# Patient Record
Sex: MALE | Race: WHITE | NOT HISPANIC OR LATINO | Employment: OTHER | URBAN - METROPOLITAN AREA
[De-identification: names, ages, dates, MRNs, and addresses within clinical notes are randomized per-mention and may not be internally consistent; named-entity substitution may affect disease eponyms.]

---

## 2020-07-16 ENCOUNTER — LAB VISIT (OUTPATIENT)
Dept: LAB | Facility: HOSPITAL | Age: 40
End: 2020-07-16
Attending: INTERNAL MEDICINE
Payer: COMMERCIAL

## 2020-07-16 ENCOUNTER — OFFICE VISIT (OUTPATIENT)
Dept: INTERNAL MEDICINE | Facility: CLINIC | Age: 40
End: 2020-07-16
Payer: COMMERCIAL

## 2020-07-16 VITALS
TEMPERATURE: 98 F | SYSTOLIC BLOOD PRESSURE: 126 MMHG | RESPIRATION RATE: 16 BRPM | BODY MASS INDEX: 28.22 KG/M2 | WEIGHT: 212.94 LBS | DIASTOLIC BLOOD PRESSURE: 62 MMHG | HEIGHT: 73 IN | HEART RATE: 72 BPM | OXYGEN SATURATION: 99 %

## 2020-07-16 DIAGNOSIS — Z00.00 ANNUAL PHYSICAL EXAM: ICD-10-CM

## 2020-07-16 DIAGNOSIS — Z00.00 ANNUAL PHYSICAL EXAM: Primary | ICD-10-CM

## 2020-07-16 DIAGNOSIS — Z72.0 TOBACCO ABUSE: ICD-10-CM

## 2020-07-16 LAB
ALBUMIN SERPL BCP-MCNC: 4.2 G/DL (ref 3.5–5.2)
ALP SERPL-CCNC: 74 U/L (ref 55–135)
ALT SERPL W/O P-5'-P-CCNC: 58 U/L (ref 10–44)
ANION GAP SERPL CALC-SCNC: 8 MMOL/L (ref 8–16)
AST SERPL-CCNC: 30 U/L (ref 10–40)
BASOPHILS # BLD AUTO: 0.05 K/UL (ref 0–0.2)
BASOPHILS NFR BLD: 0.9 % (ref 0–1.9)
BILIRUB SERPL-MCNC: 0.6 MG/DL (ref 0.1–1)
BUN SERPL-MCNC: 10 MG/DL (ref 6–20)
CALCIUM SERPL-MCNC: 9.5 MG/DL (ref 8.7–10.5)
CHLORIDE SERPL-SCNC: 106 MMOL/L (ref 95–110)
CHOLEST SERPL-MCNC: 176 MG/DL (ref 120–199)
CHOLEST/HDLC SERPL: 4.1 {RATIO} (ref 2–5)
CO2 SERPL-SCNC: 27 MMOL/L (ref 23–29)
CREAT SERPL-MCNC: 1.2 MG/DL (ref 0.5–1.4)
DIFFERENTIAL METHOD: ABNORMAL
EOSINOPHIL # BLD AUTO: 0 K/UL (ref 0–0.5)
EOSINOPHIL NFR BLD: 0.2 % (ref 0–8)
ERYTHROCYTE [DISTWIDTH] IN BLOOD BY AUTOMATED COUNT: 12.8 % (ref 11.5–14.5)
EST. GFR  (AFRICAN AMERICAN): >60 ML/MIN/1.73 M^2
EST. GFR  (NON AFRICAN AMERICAN): >60 ML/MIN/1.73 M^2
GLUCOSE SERPL-MCNC: 102 MG/DL (ref 70–110)
HCT VFR BLD AUTO: 41.4 % (ref 40–54)
HDLC SERPL-MCNC: 43 MG/DL (ref 40–75)
HDLC SERPL: 24.4 % (ref 20–50)
HGB BLD-MCNC: 13.9 G/DL (ref 14–18)
IMM GRANULOCYTES # BLD AUTO: 0.03 K/UL (ref 0–0.04)
IMM GRANULOCYTES NFR BLD AUTO: 0.6 % (ref 0–0.5)
LDLC SERPL CALC-MCNC: 114.2 MG/DL (ref 63–159)
LYMPHOCYTES # BLD AUTO: 2.3 K/UL (ref 1–4.8)
LYMPHOCYTES NFR BLD: 42.8 % (ref 18–48)
MCH RBC QN AUTO: 30.2 PG (ref 27–31)
MCHC RBC AUTO-ENTMCNC: 33.6 G/DL (ref 32–36)
MCV RBC AUTO: 90 FL (ref 82–98)
MONOCYTES # BLD AUTO: 0.5 K/UL (ref 0.3–1)
MONOCYTES NFR BLD: 8.9 % (ref 4–15)
NEUTROPHILS # BLD AUTO: 2.5 K/UL (ref 1.8–7.7)
NEUTROPHILS NFR BLD: 46.6 % (ref 38–73)
NONHDLC SERPL-MCNC: 133 MG/DL
NRBC BLD-RTO: 0 /100 WBC
PLATELET # BLD AUTO: 257 K/UL (ref 150–350)
PMV BLD AUTO: 10.7 FL (ref 9.2–12.9)
POTASSIUM SERPL-SCNC: 4.3 MMOL/L (ref 3.5–5.1)
PROT SERPL-MCNC: 7.3 G/DL (ref 6–8.4)
RBC # BLD AUTO: 4.61 M/UL (ref 4.6–6.2)
SODIUM SERPL-SCNC: 141 MMOL/L (ref 136–145)
TRIGL SERPL-MCNC: 94 MG/DL (ref 30–150)
TSH SERPL DL<=0.005 MIU/L-ACNC: 0.95 UIU/ML (ref 0.4–4)
WBC # BLD AUTO: 5.42 K/UL (ref 3.9–12.7)

## 2020-07-16 PROCEDURE — 99385 PREV VISIT NEW AGE 18-39: CPT | Mod: S$GLB,,, | Performed by: INTERNAL MEDICINE

## 2020-07-16 PROCEDURE — 84443 ASSAY THYROID STIM HORMONE: CPT

## 2020-07-16 PROCEDURE — 99999 PR PBB SHADOW E&M-EST. PATIENT-LVL III: ICD-10-PCS | Mod: PBBFAC,,, | Performed by: INTERNAL MEDICINE

## 2020-07-16 PROCEDURE — 36415 COLL VENOUS BLD VENIPUNCTURE: CPT | Mod: PO

## 2020-07-16 PROCEDURE — 99385 PR PREVENTIVE VISIT,NEW,18-39: ICD-10-PCS | Mod: S$GLB,,, | Performed by: INTERNAL MEDICINE

## 2020-07-16 PROCEDURE — 80061 LIPID PANEL: CPT

## 2020-07-16 PROCEDURE — 85025 COMPLETE CBC W/AUTO DIFF WBC: CPT

## 2020-07-16 PROCEDURE — 99999 PR PBB SHADOW E&M-EST. PATIENT-LVL III: CPT | Mod: PBBFAC,,, | Performed by: INTERNAL MEDICINE

## 2020-07-16 PROCEDURE — 80053 COMPREHEN METABOLIC PANEL: CPT

## 2020-07-16 RX ORDER — VARENICLINE TARTRATE 0.5 MG/1
0.5 TABLET, FILM COATED ORAL 2 TIMES DAILY
Qty: 9 TABLET | Refills: 0 | Status: SHIPPED | OUTPATIENT
Start: 2020-07-16 | End: 2021-11-10

## 2020-07-16 RX ORDER — VARENICLINE TARTRATE 1 MG/1
1 TABLET, FILM COATED ORAL 2 TIMES DAILY
Qty: 60 TABLET | Refills: 2 | Status: SHIPPED | OUTPATIENT
Start: 2020-07-16 | End: 2020-08-15

## 2020-07-16 NOTE — PROGRESS NOTES
Subjective:       Patient ID: Sukhwinder Lyon is a 40 y.o. male.    Chief Complaint: Establish Care and Annual Exam    HPI     40 y.o. male here for annual exam.     Cholesterol: needs  Vaccines: Influenza - not done; Tetanus - done last 10 yrs, not sure the last date. (clin psych)  Sexual Screening: active  STD screening: no concern  Eye exam: done last April  Prostate: no family history of cancer  Colonoscopy: Uncle with cancer.    Exercise:  Not recently.  Couple months ago, he was better about 30 minutes.  Diet: mostly home cooked.  Tries to limit fast food to once every 2 weeks.    History reviewed. No pertinent past medical history.  History reviewed. No pertinent surgical history.  Social History     Socioeconomic History    Marital status: Single     Spouse name: Not on file    Number of children: Not on file    Years of education: Not on file    Highest education level: Not on file   Occupational History    Not on file   Social Needs    Financial resource strain: Not hard at all    Food insecurity     Worry: Never true     Inability: Never true    Transportation needs     Medical: No     Non-medical: No   Tobacco Use    Smoking status: Current Some Day Smoker     Packs/day: 0.50     Years: 20.00     Pack years: 10.00     Types: Vaping with nicotine    Smokeless tobacco: Never Used    Tobacco comment: Formerly tobacco smoker, half pack per day, switched to vaping using half pod per day   Substance and Sexual Activity    Alcohol use: Yes     Alcohol/week: 6.0 standard drinks     Types: 3 Glasses of wine, 3 Cans of beer per week     Frequency: 2-3 times a week     Drinks per session: 1 or 2     Binge frequency: Less than monthly     Comment: Typically one or two drinks per occasion, 2-3 times per week    Drug use: Yes     Types: Marijuana     Comment: Occasional marijuana use, no problematic usage    Sexual activity: Yes     Partners: Male     Birth control/protection: Condom     Comment: One  sexual partner, committed relationship   Lifestyle    Physical activity     Days per week: 1 day     Minutes per session: 30 min    Stress: Not at all   Relationships    Social connections     Talks on phone: More than three times a week     Gets together: Twice a week     Attends Voodoo service: Not on file     Active member of club or organization: Yes     Attends meetings of clubs or organizations: 1 to 4 times per year     Relationship status: Living with partner   Other Topics Concern    Not on file   Social History Narrative    Not on file     Review of patient's allergies indicates:  No Known Allergies  Sukhwinder Lyon had no medications administered during this visit.    Review of Systems   Constitutional: Negative for activity change and unexpected weight change.   HENT: Negative for hearing loss, rhinorrhea and trouble swallowing.    Eyes: Negative for discharge and visual disturbance.   Respiratory: Negative for chest tightness and wheezing.    Cardiovascular: Negative for chest pain and palpitations.   Gastrointestinal: Negative for blood in stool, constipation, diarrhea and vomiting.   Endocrine: Negative for polydipsia and polyuria.   Genitourinary: Negative for difficulty urinating, hematuria and urgency.   Musculoskeletal: Negative for arthralgias, joint swelling and neck pain.   Neurological: Negative for weakness and headaches.   Psychiatric/Behavioral: Negative for confusion and dysphoric mood.         Objective:      Physical Exam  Vitals signs reviewed.   Constitutional:       Appearance: He is well-developed.   HENT:      Head: Normocephalic and atraumatic.      Mouth/Throat:      Pharynx: No oropharyngeal exudate.   Eyes:      General: No scleral icterus.        Right eye: No discharge.         Left eye: No discharge.      Pupils: Pupils are equal, round, and reactive to light.   Neck:      Musculoskeletal: Normal range of motion and neck supple.      Thyroid: No thyromegaly.       Trachea: No tracheal deviation.   Cardiovascular:      Rate and Rhythm: Normal rate and regular rhythm.      Heart sounds: Normal heart sounds. No murmur. No friction rub. No gallop.    Pulmonary:      Effort: Pulmonary effort is normal. No respiratory distress.      Breath sounds: Normal breath sounds. No wheezing or rales.   Chest:      Chest wall: No tenderness.   Abdominal:      General: Bowel sounds are normal. There is no distension.      Palpations: Abdomen is soft. There is no mass.      Tenderness: There is no abdominal tenderness. There is no guarding or rebound.   Musculoskeletal: Normal range of motion.         General: No tenderness.   Skin:     General: Skin is warm and dry.      Coloration: Skin is not pale.      Findings: No erythema or rash.   Neurological:      Mental Status: He is alert and oriented to person, place, and time.   Psychiatric:         Behavior: Behavior normal.         Assessment:       1. Annual physical exam    2. Tobacco abuse        Plan:       1.  Check CBC, CMP, TSH, lipids.  Discussed diet and exercise.  Up-to-date on vaccines.  2.  Tried Chantix to assist with quitting nicotine.  Discussed potential side effects of medication.

## 2020-07-17 ENCOUNTER — TELEPHONE (OUTPATIENT)
Dept: INTERNAL MEDICINE | Facility: CLINIC | Age: 40
End: 2020-07-17

## 2020-07-17 DIAGNOSIS — R79.89 ELEVATED LFTS: Primary | ICD-10-CM

## 2020-07-17 NOTE — TELEPHONE ENCOUNTER
Your thyroid function, electrolytes, kidney function, cholesterol are normal.  Your liver function is elevated.  I am ordering further lab work to evaluate.

## 2020-07-24 ENCOUNTER — LAB VISIT (OUTPATIENT)
Dept: LAB | Facility: HOSPITAL | Age: 40
End: 2020-07-24
Attending: INTERNAL MEDICINE
Payer: COMMERCIAL

## 2020-07-24 DIAGNOSIS — R79.89 ELEVATED LFTS: ICD-10-CM

## 2020-07-24 LAB
ALBUMIN SERPL BCP-MCNC: 4.6 G/DL (ref 3.5–5.2)
ALP SERPL-CCNC: 71 U/L (ref 55–135)
ALT SERPL W/O P-5'-P-CCNC: 56 U/L (ref 10–44)
ANION GAP SERPL CALC-SCNC: 6 MMOL/L (ref 8–16)
AST SERPL-CCNC: 28 U/L (ref 10–40)
BILIRUB SERPL-MCNC: 0.8 MG/DL (ref 0.1–1)
BUN SERPL-MCNC: 12 MG/DL (ref 6–20)
CALCIUM SERPL-MCNC: 9.4 MG/DL (ref 8.7–10.5)
CERULOPLASMIN SERPL-MCNC: 28 MG/DL (ref 15–45)
CHLORIDE SERPL-SCNC: 108 MMOL/L (ref 95–110)
CO2 SERPL-SCNC: 25 MMOL/L (ref 23–29)
CREAT SERPL-MCNC: 1 MG/DL (ref 0.5–1.4)
EST. GFR  (AFRICAN AMERICAN): >60 ML/MIN/1.73 M^2
EST. GFR  (NON AFRICAN AMERICAN): >60 ML/MIN/1.73 M^2
FERRITIN SERPL-MCNC: 209 NG/ML (ref 20–300)
GLUCOSE SERPL-MCNC: 101 MG/DL (ref 70–110)
POTASSIUM SERPL-SCNC: 3.9 MMOL/L (ref 3.5–5.1)
PROT SERPL-MCNC: 7.6 G/DL (ref 6–8.4)
SODIUM SERPL-SCNC: 139 MMOL/L (ref 136–145)

## 2020-07-24 PROCEDURE — 81596 NFCT DS CHRNC HCV 6 ASSAYS: CPT

## 2020-07-24 PROCEDURE — 80074 ACUTE HEPATITIS PANEL: CPT

## 2020-07-24 PROCEDURE — 82390 ASSAY OF CERULOPLASMIN: CPT

## 2020-07-24 PROCEDURE — 82728 ASSAY OF FERRITIN: CPT

## 2020-07-24 PROCEDURE — 80053 COMPREHEN METABOLIC PANEL: CPT

## 2020-07-27 LAB
A2 MACROGLOB SERPL-MCNC: 123 MG/DL (ref 100–280)
ALT SERPL W P-5'-P-CCNC: 59 U/L (ref 7–55)
APO A-I SERPL-MCNC: 127 MG/DL
BILIRUB SERPL-MCNC: 0.8 MG/DL
BIOPREDICTIVE SERIAL NUMBER: ABNORMAL
FIBROSIS STAGE SERPL QL: ABNORMAL
FIBROTEST INTERPRETATION: ABNORMAL
FIBROTEST-ACTITEST COMMENT: ABNORMAL
GGT SERPL-CCNC: 49 U/L (ref 8–61)
HAPTOGLOB SERPL-MCNC: 77 MG/DL (ref 30–200)
HAV IGM SERPL QL IA: NEGATIVE
HBV CORE IGM SERPL QL IA: NEGATIVE
HBV SURFACE AG SERPL QL IA: NEGATIVE
HCV AB SERPL QL IA: NEGATIVE
LIVER FIBR SCORE SERPL CALC.FIBROSURE: 0.2
NECROINFLAMMAT INTERP: ABNORMAL
NECROINFLAMMATORY ACT GRADE SERPL QL: ABNORMAL
NECROINFLAMMATORY ACT SCORE SERPL: 0.32

## 2020-07-31 ENCOUNTER — HOSPITAL ENCOUNTER (OUTPATIENT)
Dept: RADIOLOGY | Facility: HOSPITAL | Age: 40
Discharge: HOME | End: 2020-07-31
Attending: INTERNAL MEDICINE
Payer: COMMERCIAL

## 2020-07-31 DIAGNOSIS — R79.89 ELEVATED LFTS: ICD-10-CM

## 2020-07-31 PROCEDURE — 76705 ECHO EXAM OF ABDOMEN: CPT | Mod: 26,,, | Performed by: RADIOLOGY

## 2020-07-31 PROCEDURE — 76705 US ABDOMEN LIMITED: ICD-10-PCS | Mod: 26,,, | Performed by: RADIOLOGY

## 2020-07-31 PROCEDURE — 76705 ECHO EXAM OF ABDOMEN: CPT | Mod: TC

## 2020-08-04 ENCOUNTER — PATIENT MESSAGE (OUTPATIENT)
Dept: INTERNAL MEDICINE | Facility: CLINIC | Age: 40
End: 2020-08-04

## 2020-08-04 DIAGNOSIS — R53.83 FATIGUE, UNSPECIFIED TYPE: Primary | ICD-10-CM

## 2020-08-10 ENCOUNTER — LAB VISIT (OUTPATIENT)
Dept: LAB | Facility: HOSPITAL | Age: 40
End: 2020-08-10
Attending: INTERNAL MEDICINE
Payer: COMMERCIAL

## 2020-08-10 ENCOUNTER — TELEPHONE (OUTPATIENT)
Dept: INTERNAL MEDICINE | Facility: CLINIC | Age: 40
End: 2020-08-10

## 2020-08-10 DIAGNOSIS — R53.83 FATIGUE, UNSPECIFIED TYPE: ICD-10-CM

## 2020-08-10 DIAGNOSIS — R53.83 FATIGUE, UNSPECIFIED TYPE: Primary | ICD-10-CM

## 2020-08-10 LAB — TESTOST SERPL-MCNC: 279 NG/DL (ref 304–1227)

## 2020-08-10 PROCEDURE — 36415 COLL VENOUS BLD VENIPUNCTURE: CPT | Mod: PO

## 2020-08-10 PROCEDURE — 84403 ASSAY OF TOTAL TESTOSTERONE: CPT

## 2020-08-10 NOTE — TELEPHONE ENCOUNTER
Your testosterone is low.  We need to recheck before we can treat.  I am ordering the recheck for you.

## 2020-08-24 ENCOUNTER — LAB VISIT (OUTPATIENT)
Dept: LAB | Facility: HOSPITAL | Age: 40
End: 2020-08-24
Attending: INTERNAL MEDICINE
Payer: COMMERCIAL

## 2020-08-24 DIAGNOSIS — R53.83 FATIGUE, UNSPECIFIED TYPE: ICD-10-CM

## 2020-08-24 LAB — TESTOST SERPL-MCNC: 333 NG/DL (ref 304–1227)

## 2020-08-24 PROCEDURE — 36415 COLL VENOUS BLD VENIPUNCTURE: CPT | Mod: PO

## 2020-08-24 PROCEDURE — 84403 ASSAY OF TOTAL TESTOSTERONE: CPT

## 2020-08-25 ENCOUNTER — PATIENT MESSAGE (OUTPATIENT)
Dept: INTERNAL MEDICINE | Facility: CLINIC | Age: 40
End: 2020-08-25

## 2020-08-25 DIAGNOSIS — R53.83 FATIGUE, UNSPECIFIED TYPE: Primary | ICD-10-CM

## 2020-09-21 ENCOUNTER — LAB VISIT (OUTPATIENT)
Dept: LAB | Facility: HOSPITAL | Age: 40
End: 2020-09-21
Attending: INTERNAL MEDICINE
Payer: COMMERCIAL

## 2020-09-21 DIAGNOSIS — R53.83 FATIGUE, UNSPECIFIED TYPE: ICD-10-CM

## 2020-09-21 LAB — TESTOST SERPL-MCNC: 458 NG/DL (ref 304–1227)

## 2020-09-21 PROCEDURE — 36415 COLL VENOUS BLD VENIPUNCTURE: CPT | Mod: PO

## 2020-09-21 PROCEDURE — 84403 ASSAY OF TOTAL TESTOSTERONE: CPT

## 2020-09-23 ENCOUNTER — TELEPHONE (OUTPATIENT)
Dept: INTERNAL MEDICINE | Facility: CLINIC | Age: 40
End: 2020-09-23

## 2020-12-08 ENCOUNTER — PATIENT MESSAGE (OUTPATIENT)
Dept: INTERNAL MEDICINE | Facility: CLINIC | Age: 40
End: 2020-12-08

## 2020-12-08 ENCOUNTER — LAB VISIT (OUTPATIENT)
Dept: INTERNAL MEDICINE | Facility: CLINIC | Age: 40
End: 2020-12-08
Payer: COMMERCIAL

## 2020-12-08 DIAGNOSIS — R51.9 HEAD ACHE: ICD-10-CM

## 2020-12-08 DIAGNOSIS — Z03.818 ENCOUNTER FOR OBSERVATION FOR SUSPECTED EXPOSURE TO OTHER BIOLOGICAL AGENTS RULED OUT: ICD-10-CM

## 2020-12-08 DIAGNOSIS — M79.10 MUSCLE PAIN: ICD-10-CM

## 2020-12-08 DIAGNOSIS — R50.9 FEVER: ICD-10-CM

## 2020-12-08 DIAGNOSIS — R68.83 CHILLS: ICD-10-CM

## 2020-12-08 PROCEDURE — U0003 INFECTIOUS AGENT DETECTION BY NUCLEIC ACID (DNA OR RNA); SEVERE ACUTE RESPIRATORY SYNDROME CORONAVIRUS 2 (SARS-COV-2) (CORONAVIRUS DISEASE [COVID-19]), AMPLIFIED PROBE TECHNIQUE, MAKING USE OF HIGH THROUGHPUT TECHNOLOGIES AS DESCRIBED BY CMS-2020-01-R: HCPCS

## 2020-12-11 ENCOUNTER — TELEPHONE (OUTPATIENT)
Dept: INTERNAL MEDICINE | Facility: CLINIC | Age: 40
End: 2020-12-11

## 2020-12-11 DIAGNOSIS — U07.1 COVID-19 VIRUS DETECTED: ICD-10-CM

## 2020-12-11 LAB — SARS-COV-2 RNA RESP QL NAA+PROBE: DETECTED

## 2020-12-11 RX ORDER — FLUTICASONE FUROATE AND VILANTEROL TRIFENATATE 100; 25 UG/1; UG/1
1 POWDER RESPIRATORY (INHALATION) DAILY
Qty: 30 EACH | Refills: 0 | Status: SHIPPED | OUTPATIENT
Start: 2020-12-11 | End: 2021-11-08

## 2020-12-11 RX ORDER — METHYLPREDNISOLONE 4 MG/1
TABLET ORAL
Qty: 21 TABLET | Refills: 0 | Status: SHIPPED | OUTPATIENT
Start: 2020-12-11 | End: 2021-11-08

## 2021-01-04 ENCOUNTER — PATIENT MESSAGE (OUTPATIENT)
Dept: INTERNAL MEDICINE | Facility: CLINIC | Age: 41
End: 2021-01-04

## 2021-01-06 ENCOUNTER — PATIENT MESSAGE (OUTPATIENT)
Dept: INTERNAL MEDICINE | Facility: CLINIC | Age: 41
End: 2021-01-06

## 2021-01-07 ENCOUNTER — PATIENT MESSAGE (OUTPATIENT)
Dept: INTERNAL MEDICINE | Facility: CLINIC | Age: 41
End: 2021-01-07

## 2021-01-08 ENCOUNTER — PATIENT MESSAGE (OUTPATIENT)
Dept: INTERNAL MEDICINE | Facility: CLINIC | Age: 41
End: 2021-01-08

## 2021-01-10 ENCOUNTER — IMMUNIZATION (OUTPATIENT)
Dept: OBSTETRICS AND GYNECOLOGY | Facility: CLINIC | Age: 41
End: 2021-01-10
Payer: COMMERCIAL

## 2021-01-10 DIAGNOSIS — Z23 NEED FOR VACCINATION: ICD-10-CM

## 2021-01-10 PROCEDURE — 91300 COVID-19, MRNA, LNP-S, PF, 30 MCG/0.3 ML DOSE VACCINE: CPT | Mod: PBBFAC | Performed by: FAMILY MEDICINE

## 2021-01-31 ENCOUNTER — IMMUNIZATION (OUTPATIENT)
Dept: OBSTETRICS AND GYNECOLOGY | Facility: CLINIC | Age: 41
End: 2021-01-31
Payer: COMMERCIAL

## 2021-01-31 DIAGNOSIS — Z23 NEED FOR VACCINATION: Primary | ICD-10-CM

## 2021-01-31 PROCEDURE — 0002A PR IMMUNIZ ADMIN, SARS-COV-2 COVID-19 VACC, 30MCG/0.3ML, 2ND DOSE: CPT | Mod: PBBFAC | Performed by: FAMILY MEDICINE

## 2021-01-31 PROCEDURE — 91300 PR SARS-COV- 2 COVID-19 VACCINE, NO PRSV, 30MCG/0.3ML, IM: CPT | Mod: PBBFAC | Performed by: FAMILY MEDICINE

## 2021-01-31 RX ADMIN — RNA INGREDIENT BNT-162B2 0.3 ML: 0.23 INJECTION, SUSPENSION INTRAMUSCULAR at 01:01

## 2021-03-18 ENCOUNTER — PATIENT MESSAGE (OUTPATIENT)
Dept: RESEARCH | Facility: HOSPITAL | Age: 41
End: 2021-03-18

## 2021-03-26 ENCOUNTER — PATIENT MESSAGE (OUTPATIENT)
Dept: RESEARCH | Facility: HOSPITAL | Age: 41
End: 2021-03-26

## 2021-10-04 ENCOUNTER — PATIENT MESSAGE (OUTPATIENT)
Dept: ADMINISTRATIVE | Facility: HOSPITAL | Age: 41
End: 2021-10-04

## 2021-11-10 ENCOUNTER — OFFICE VISIT (OUTPATIENT)
Dept: INTERNAL MEDICINE | Facility: CLINIC | Age: 41
End: 2021-11-10
Payer: COMMERCIAL

## 2021-11-10 ENCOUNTER — LAB VISIT (OUTPATIENT)
Dept: LAB | Facility: HOSPITAL | Age: 41
End: 2021-11-10
Attending: INTERNAL MEDICINE
Payer: COMMERCIAL

## 2021-11-10 VITALS
WEIGHT: 221.13 LBS | HEART RATE: 81 BPM | TEMPERATURE: 98 F | RESPIRATION RATE: 14 BRPM | DIASTOLIC BLOOD PRESSURE: 68 MMHG | HEIGHT: 73 IN | SYSTOLIC BLOOD PRESSURE: 128 MMHG | BODY MASS INDEX: 29.31 KG/M2

## 2021-11-10 DIAGNOSIS — Z11.4 ENCOUNTER FOR SCREENING FOR HIV: ICD-10-CM

## 2021-11-10 DIAGNOSIS — Z12.5 PROSTATE CANCER SCREENING: ICD-10-CM

## 2021-11-10 DIAGNOSIS — Z00.00 ANNUAL PHYSICAL EXAM: Primary | ICD-10-CM

## 2021-11-10 DIAGNOSIS — Z00.00 ANNUAL PHYSICAL EXAM: ICD-10-CM

## 2021-11-10 LAB
ALBUMIN SERPL BCP-MCNC: 4.4 G/DL (ref 3.5–5.2)
ALP SERPL-CCNC: 65 U/L (ref 55–135)
ALT SERPL W/O P-5'-P-CCNC: 33 U/L (ref 10–44)
ANION GAP SERPL CALC-SCNC: 8 MMOL/L (ref 8–16)
AST SERPL-CCNC: 21 U/L (ref 10–40)
BASOPHILS # BLD AUTO: 0.04 K/UL (ref 0–0.2)
BASOPHILS NFR BLD: 0.8 % (ref 0–1.9)
BILIRUB SERPL-MCNC: 0.7 MG/DL (ref 0.1–1)
BUN SERPL-MCNC: 14 MG/DL (ref 6–20)
CALCIUM SERPL-MCNC: 9.5 MG/DL (ref 8.7–10.5)
CHLORIDE SERPL-SCNC: 106 MMOL/L (ref 95–110)
CHOLEST SERPL-MCNC: 167 MG/DL (ref 120–199)
CHOLEST/HDLC SERPL: 3.5 {RATIO} (ref 2–5)
CO2 SERPL-SCNC: 26 MMOL/L (ref 23–29)
COMPLEXED PSA SERPL-MCNC: 0.27 NG/ML (ref 0–4)
CREAT SERPL-MCNC: 1.1 MG/DL (ref 0.5–1.4)
DIFFERENTIAL METHOD: ABNORMAL
EOSINOPHIL # BLD AUTO: 0 K/UL (ref 0–0.5)
EOSINOPHIL NFR BLD: 0 % (ref 0–8)
ERYTHROCYTE [DISTWIDTH] IN BLOOD BY AUTOMATED COUNT: 12.4 % (ref 11.5–14.5)
EST. GFR  (AFRICAN AMERICAN): >60 ML/MIN/1.73 M^2
EST. GFR  (NON AFRICAN AMERICAN): >60 ML/MIN/1.73 M^2
GLUCOSE SERPL-MCNC: 109 MG/DL (ref 70–110)
HCT VFR BLD AUTO: 42.1 % (ref 40–54)
HDLC SERPL-MCNC: 48 MG/DL (ref 40–75)
HDLC SERPL: 28.7 % (ref 20–50)
HGB BLD-MCNC: 13.8 G/DL (ref 14–18)
HIV 1+2 AB+HIV1 P24 AG SERPL QL IA: NEGATIVE
IMM GRANULOCYTES # BLD AUTO: 0.01 K/UL (ref 0–0.04)
IMM GRANULOCYTES NFR BLD AUTO: 0.2 % (ref 0–0.5)
LDLC SERPL CALC-MCNC: 103.4 MG/DL (ref 63–159)
LYMPHOCYTES # BLD AUTO: 2.4 K/UL (ref 1–4.8)
LYMPHOCYTES NFR BLD: 49.7 % (ref 18–48)
MCH RBC QN AUTO: 29.6 PG (ref 27–31)
MCHC RBC AUTO-ENTMCNC: 32.8 G/DL (ref 32–36)
MCV RBC AUTO: 90 FL (ref 82–98)
MONOCYTES # BLD AUTO: 0.4 K/UL (ref 0.3–1)
MONOCYTES NFR BLD: 8.8 % (ref 4–15)
NEUTROPHILS # BLD AUTO: 1.9 K/UL (ref 1.8–7.7)
NEUTROPHILS NFR BLD: 40.5 % (ref 38–73)
NONHDLC SERPL-MCNC: 119 MG/DL
NRBC BLD-RTO: 0 /100 WBC
PLATELET # BLD AUTO: 268 K/UL (ref 150–450)
PLATELET BLD QL SMEAR: ABNORMAL
PMV BLD AUTO: 10.7 FL (ref 9.2–12.9)
POTASSIUM SERPL-SCNC: 4 MMOL/L (ref 3.5–5.1)
PROT SERPL-MCNC: 7.5 G/DL (ref 6–8.4)
RBC # BLD AUTO: 4.67 M/UL (ref 4.6–6.2)
SMUDGE CELLS BLD QL SMEAR: PRESENT
SODIUM SERPL-SCNC: 140 MMOL/L (ref 136–145)
TRIGL SERPL-MCNC: 78 MG/DL (ref 30–150)
TSH SERPL DL<=0.005 MIU/L-ACNC: 1.12 UIU/ML (ref 0.4–4)
WBC # BLD AUTO: 4.77 K/UL (ref 3.9–12.7)

## 2021-11-10 PROCEDURE — 87389 HIV-1 AG W/HIV-1&-2 AB AG IA: CPT | Performed by: INTERNAL MEDICINE

## 2021-11-10 PROCEDURE — 99396 PR PREVENTIVE VISIT,EST,40-64: ICD-10-PCS | Mod: 25,S$GLB,, | Performed by: INTERNAL MEDICINE

## 2021-11-10 PROCEDURE — 90471 IMMUNIZATION ADMIN: CPT | Mod: S$GLB,,, | Performed by: INTERNAL MEDICINE

## 2021-11-10 PROCEDURE — 36415 COLL VENOUS BLD VENIPUNCTURE: CPT | Mod: PO | Performed by: INTERNAL MEDICINE

## 2021-11-10 PROCEDURE — 84153 ASSAY OF PSA TOTAL: CPT | Performed by: INTERNAL MEDICINE

## 2021-11-10 PROCEDURE — 3008F BODY MASS INDEX DOCD: CPT | Mod: CPTII,S$GLB,, | Performed by: INTERNAL MEDICINE

## 2021-11-10 PROCEDURE — 90686 IIV4 VACC NO PRSV 0.5 ML IM: CPT | Mod: S$GLB,,, | Performed by: INTERNAL MEDICINE

## 2021-11-10 PROCEDURE — 90472 IMMUNIZATION ADMIN EACH ADD: CPT | Mod: S$GLB,,, | Performed by: INTERNAL MEDICINE

## 2021-11-10 PROCEDURE — 80053 COMPREHEN METABOLIC PANEL: CPT | Performed by: INTERNAL MEDICINE

## 2021-11-10 PROCEDURE — 1160F RVW MEDS BY RX/DR IN RCRD: CPT | Mod: CPTII,S$GLB,, | Performed by: INTERNAL MEDICINE

## 2021-11-10 PROCEDURE — 3078F DIAST BP <80 MM HG: CPT | Mod: CPTII,S$GLB,, | Performed by: INTERNAL MEDICINE

## 2021-11-10 PROCEDURE — 99999 PR PBB SHADOW E&M-EST. PATIENT-LVL III: ICD-10-PCS | Mod: PBBFAC,,, | Performed by: INTERNAL MEDICINE

## 2021-11-10 PROCEDURE — 85025 COMPLETE CBC W/AUTO DIFF WBC: CPT | Performed by: INTERNAL MEDICINE

## 2021-11-10 PROCEDURE — 90471 FLU VACCINE (QUAD) GREATER THAN OR EQUAL TO 3YO PRESERVATIVE FREE IM: ICD-10-PCS | Mod: S$GLB,,, | Performed by: INTERNAL MEDICINE

## 2021-11-10 PROCEDURE — 3078F PR MOST RECENT DIASTOLIC BLOOD PRESSURE < 80 MM HG: ICD-10-PCS | Mod: CPTII,S$GLB,, | Performed by: INTERNAL MEDICINE

## 2021-11-10 PROCEDURE — 1160F PR REVIEW ALL MEDS BY PRESCRIBER/CLIN PHARMACIST DOCUMENTED: ICD-10-PCS | Mod: CPTII,S$GLB,, | Performed by: INTERNAL MEDICINE

## 2021-11-10 PROCEDURE — 3074F SYST BP LT 130 MM HG: CPT | Mod: CPTII,S$GLB,, | Performed by: INTERNAL MEDICINE

## 2021-11-10 PROCEDURE — 99396 PREV VISIT EST AGE 40-64: CPT | Mod: 25,S$GLB,, | Performed by: INTERNAL MEDICINE

## 2021-11-10 PROCEDURE — 3008F PR BODY MASS INDEX (BMI) DOCUMENTED: ICD-10-PCS | Mod: CPTII,S$GLB,, | Performed by: INTERNAL MEDICINE

## 2021-11-10 PROCEDURE — 90715 TDAP VACCINE 7 YRS/> IM: CPT | Mod: S$GLB,,, | Performed by: INTERNAL MEDICINE

## 2021-11-10 PROCEDURE — 84443 ASSAY THYROID STIM HORMONE: CPT | Performed by: INTERNAL MEDICINE

## 2021-11-10 PROCEDURE — 80061 LIPID PANEL: CPT | Performed by: INTERNAL MEDICINE

## 2021-11-10 PROCEDURE — 1159F PR MEDICATION LIST DOCUMENTED IN MEDICAL RECORD: ICD-10-PCS | Mod: CPTII,S$GLB,, | Performed by: INTERNAL MEDICINE

## 2021-11-10 PROCEDURE — 99999 PR PBB SHADOW E&M-EST. PATIENT-LVL III: CPT | Mod: PBBFAC,,, | Performed by: INTERNAL MEDICINE

## 2021-11-10 PROCEDURE — 3074F PR MOST RECENT SYSTOLIC BLOOD PRESSURE < 130 MM HG: ICD-10-PCS | Mod: CPTII,S$GLB,, | Performed by: INTERNAL MEDICINE

## 2021-11-10 PROCEDURE — 90686 FLU VACCINE (QUAD) GREATER THAN OR EQUAL TO 3YO PRESERVATIVE FREE IM: ICD-10-PCS | Mod: S$GLB,,, | Performed by: INTERNAL MEDICINE

## 2021-11-10 PROCEDURE — 90472 TDAP VACCINE GREATER THAN OR EQUAL TO 7YO IM: ICD-10-PCS | Mod: S$GLB,,, | Performed by: INTERNAL MEDICINE

## 2021-11-10 PROCEDURE — 90715 TDAP VACCINE GREATER THAN OR EQUAL TO 7YO IM: ICD-10-PCS | Mod: S$GLB,,, | Performed by: INTERNAL MEDICINE

## 2021-11-10 PROCEDURE — 1159F MED LIST DOCD IN RCRD: CPT | Mod: CPTII,S$GLB,, | Performed by: INTERNAL MEDICINE

## 2022-05-03 ENCOUNTER — PATIENT MESSAGE (OUTPATIENT)
Dept: RESEARCH | Facility: HOSPITAL | Age: 42
End: 2022-05-03
Payer: COMMERCIAL

## 2022-11-15 ENCOUNTER — HOSPITAL ENCOUNTER (OUTPATIENT)
Facility: HOSPITAL | Age: 42
Discharge: HOME | End: 2022-11-16
Attending: HOSPITALIST | Admitting: STUDENT IN AN ORGANIZED HEALTH CARE EDUCATION/TRAINING PROGRAM
Payer: COMMERCIAL

## 2022-11-15 DIAGNOSIS — R47.01 APHASIA: ICD-10-CM

## 2022-11-15 DIAGNOSIS — R47.1 DYSARTHRIA: ICD-10-CM

## 2022-11-15 DIAGNOSIS — Q24.8 INTERATRIAL CARDIAC SHUNT: ICD-10-CM

## 2022-11-15 DIAGNOSIS — R29.818 TRANSIENT NEUROLOGICAL SYMPTOMS: ICD-10-CM

## 2022-11-15 DIAGNOSIS — R20.2 PARESTHESIAS: ICD-10-CM

## 2022-11-15 DIAGNOSIS — G45.9 TIA (TRANSIENT ISCHEMIC ATTACK): Primary | ICD-10-CM

## 2022-11-15 PROBLEM — Z72.0 TOBACCO USE: Status: ACTIVE | Noted: 2022-11-15

## 2022-11-15 LAB
ALBUMIN SERPL BCP-MCNC: 4.6 G/DL (ref 3.5–5.2)
ALP SERPL-CCNC: 68 U/L (ref 55–135)
ALT SERPL W/O P-5'-P-CCNC: 90 U/L (ref 10–44)
ANION GAP SERPL CALC-SCNC: 9 MMOL/L (ref 8–16)
AST SERPL-CCNC: 38 U/L (ref 10–40)
BASOPHILS # BLD AUTO: 0.06 K/UL (ref 0–0.2)
BASOPHILS NFR BLD: 1 % (ref 0–1.9)
BILIRUB SERPL-MCNC: 0.6 MG/DL (ref 0.1–1)
BUN SERPL-MCNC: 11 MG/DL (ref 6–20)
CALCIUM SERPL-MCNC: 9.6 MG/DL (ref 8.7–10.5)
CHLORIDE SERPL-SCNC: 105 MMOL/L (ref 95–110)
CHOLEST SERPL-MCNC: 166 MG/DL (ref 120–199)
CHOLEST/HDLC SERPL: 3.5 {RATIO} (ref 2–5)
CO2 SERPL-SCNC: 27 MMOL/L (ref 23–29)
CREAT SERPL-MCNC: 1.1 MG/DL (ref 0.5–1.4)
DIFFERENTIAL METHOD: NORMAL
EOSINOPHIL # BLD AUTO: 0.2 K/UL (ref 0–0.5)
EOSINOPHIL NFR BLD: 2.4 % (ref 0–8)
ERYTHROCYTE [DISTWIDTH] IN BLOOD BY AUTOMATED COUNT: 11.8 % (ref 11.5–14.5)
EST. GFR  (NO RACE VARIABLE): >60 ML/MIN/1.73 M^2
ESTIMATED AVG GLUCOSE: 111 MG/DL (ref 68–131)
GLUCOSE SERPL-MCNC: 102 MG/DL (ref 70–110)
HBA1C MFR BLD: 5.5 % (ref 4–5.6)
HCT VFR BLD AUTO: 41.5 % (ref 40–54)
HCV AB SERPL QL IA: NORMAL
HDLC SERPL-MCNC: 48 MG/DL (ref 40–75)
HDLC SERPL: 28.9 % (ref 20–50)
HGB BLD-MCNC: 14.3 G/DL (ref 14–18)
HIV 1+2 AB+HIV1 P24 AG SERPL QL IA: NORMAL
IMM GRANULOCYTES # BLD AUTO: 0.02 K/UL (ref 0–0.04)
IMM GRANULOCYTES NFR BLD AUTO: 0.3 % (ref 0–0.5)
INR PPP: 1 (ref 0.8–1.2)
LDLC SERPL CALC-MCNC: 99 MG/DL (ref 63–159)
LYMPHOCYTES # BLD AUTO: 2.6 K/UL (ref 1–4.8)
LYMPHOCYTES NFR BLD: 41 % (ref 18–48)
MCH RBC QN AUTO: 30.4 PG (ref 27–31)
MCHC RBC AUTO-ENTMCNC: 34.5 G/DL (ref 32–36)
MCV RBC AUTO: 88 FL (ref 82–98)
MONOCYTES # BLD AUTO: 0.5 K/UL (ref 0.3–1)
MONOCYTES NFR BLD: 8.2 % (ref 4–15)
NEUTROPHILS # BLD AUTO: 3 K/UL (ref 1.8–7.7)
NEUTROPHILS NFR BLD: 47.1 % (ref 38–73)
NONHDLC SERPL-MCNC: 118 MG/DL
NRBC BLD-RTO: 0 /100 WBC
PLATELET # BLD AUTO: 289 K/UL (ref 150–450)
PMV BLD AUTO: 10 FL (ref 9.2–12.9)
POTASSIUM SERPL-SCNC: 3.6 MMOL/L (ref 3.5–5.1)
PROT SERPL-MCNC: 7.6 G/DL (ref 6–8.4)
PROTHROMBIN TIME: 10.7 SEC (ref 9–12.5)
RBC # BLD AUTO: 4.71 M/UL (ref 4.6–6.2)
SODIUM SERPL-SCNC: 141 MMOL/L (ref 136–145)
TRIGL SERPL-MCNC: 95 MG/DL (ref 30–150)
TROPONIN I SERPL DL<=0.01 NG/ML-MCNC: <0.006 NG/ML (ref 0–0.03)
TSH SERPL DL<=0.005 MIU/L-ACNC: 1.6 UIU/ML (ref 0.4–4)
WBC # BLD AUTO: 6.31 K/UL (ref 3.9–12.7)

## 2022-11-15 PROCEDURE — 85025 COMPLETE CBC W/AUTO DIFF WBC: CPT | Performed by: PHYSICIAN ASSISTANT

## 2022-11-15 PROCEDURE — 85610 PROTHROMBIN TIME: CPT | Performed by: PHYSICIAN ASSISTANT

## 2022-11-15 PROCEDURE — 93005 ELECTROCARDIOGRAM TRACING: CPT

## 2022-11-15 PROCEDURE — G0378 HOSPITAL OBSERVATION PER HR: HCPCS

## 2022-11-15 PROCEDURE — 93010 EKG 12-LEAD: ICD-10-PCS | Mod: ,,, | Performed by: INTERNAL MEDICINE

## 2022-11-15 PROCEDURE — 84484 ASSAY OF TROPONIN QUANT: CPT | Performed by: PHYSICIAN ASSISTANT

## 2022-11-15 PROCEDURE — 84443 ASSAY THYROID STIM HORMONE: CPT | Performed by: PHYSICIAN ASSISTANT

## 2022-11-15 PROCEDURE — 93010 ELECTROCARDIOGRAM REPORT: CPT | Mod: ,,, | Performed by: INTERNAL MEDICINE

## 2022-11-15 PROCEDURE — 99285 PR EMERGENCY DEPT VISIT,LEVEL V: ICD-10-PCS | Mod: ,,, | Performed by: EMERGENCY MEDICINE

## 2022-11-15 PROCEDURE — 99285 EMERGENCY DEPT VISIT HI MDM: CPT | Mod: 25

## 2022-11-15 PROCEDURE — 80061 LIPID PANEL: CPT | Performed by: PHYSICIAN ASSISTANT

## 2022-11-15 PROCEDURE — 86803 HEPATITIS C AB TEST: CPT | Performed by: PHYSICIAN ASSISTANT

## 2022-11-15 PROCEDURE — 80053 COMPREHEN METABOLIC PANEL: CPT | Performed by: PHYSICIAN ASSISTANT

## 2022-11-15 PROCEDURE — 99285 EMERGENCY DEPT VISIT HI MDM: CPT | Mod: ,,, | Performed by: EMERGENCY MEDICINE

## 2022-11-15 PROCEDURE — 25500020 PHARM REV CODE 255: Performed by: EMERGENCY MEDICINE

## 2022-11-15 PROCEDURE — 87389 HIV-1 AG W/HIV-1&-2 AB AG IA: CPT | Performed by: PHYSICIAN ASSISTANT

## 2022-11-15 PROCEDURE — 83036 HEMOGLOBIN GLYCOSYLATED A1C: CPT | Performed by: PHYSICIAN ASSISTANT

## 2022-11-15 RX ORDER — CLOPIDOGREL BISULFATE 75 MG/1
75 TABLET ORAL DAILY
Status: DISCONTINUED | OUTPATIENT
Start: 2022-11-16 | End: 2022-11-16 | Stop reason: HOSPADM

## 2022-11-15 RX ORDER — SODIUM CHLORIDE 0.9 % (FLUSH) 0.9 %
10 SYRINGE (ML) INJECTION
Status: DISCONTINUED | OUTPATIENT
Start: 2022-11-15 | End: 2022-11-16 | Stop reason: HOSPADM

## 2022-11-15 RX ORDER — LABETALOL HCL 20 MG/4 ML
10 SYRINGE (ML) INTRAVENOUS
Status: DISCONTINUED | OUTPATIENT
Start: 2022-11-15 | End: 2022-11-16 | Stop reason: HOSPADM

## 2022-11-15 RX ORDER — ATORVASTATIN CALCIUM 20 MG/1
40 TABLET, FILM COATED ORAL DAILY
Status: DISCONTINUED | OUTPATIENT
Start: 2022-11-16 | End: 2022-11-16 | Stop reason: HOSPADM

## 2022-11-15 RX ORDER — ASPIRIN 81 MG/1
81 TABLET ORAL DAILY
Status: DISCONTINUED | OUTPATIENT
Start: 2022-11-16 | End: 2022-11-16 | Stop reason: HOSPADM

## 2022-11-15 RX ORDER — ACETAMINOPHEN 325 MG/1
650 TABLET ORAL EVERY 6 HOURS PRN
Status: DISCONTINUED | OUTPATIENT
Start: 2022-11-15 | End: 2022-11-16 | Stop reason: HOSPADM

## 2022-11-15 RX ADMIN — IOHEXOL 100 ML: 350 INJECTION, SOLUTION INTRAVENOUS at 04:11

## 2022-11-15 NOTE — ED PROVIDER NOTES
"Encounter Date: 11/15/2022       History     Chief Complaint   Patient presents with    Multiple complaints     At 1345 had trouble getting words out and was muffled, l hand got tingly and numb, all back to normal now     Sukhwinder Lyon is a 42 y.o. old male with no significant PMH, who presents following an episode of dysarthria with associated left-sided weakness and right-sided vision change that occurred around 1:30pm today.  States that while he was typing at work he began noticing left hand numbness/tingling and diminished peripheral vision in the right eye.  When he went to stand up, he noticed left leg weakness and felt like his speech was "garbled" when trying to ask for help.  His symptoms completely resolved after a couple of minutes, and he is not experienced recurrence of symptoms since that time.  Reports he has never had a similar episode previously.  Denies any chest pain, shortness of breath, dizziness, headache, or any other associated symptoms preceding this event.  Denies starting any new medications.        Review of patient's allergies indicates:  No Known Allergies  No past medical history on file.  No past surgical history on file.  Family History   Problem Relation Age of Onset    Alcohol abuse Paternal Uncle     Cancer Paternal Uncle         Lung Cancer/    Early death Paternal Uncle          at 50 years old, cancer    Alcohol abuse Maternal Uncle     COPD Maternal Uncle     Alcohol abuse Maternal Uncle     Early death Maternal Uncle          at , alcohol related liver disease    Alcohol abuse Maternal Grandfather     Early death Maternal Grandfather          in 50's, myocardial infarction    Heart disease Maternal Grandfather     Arthritis Maternal Grandmother     COPD Maternal Grandmother     Depression Maternal Grandmother         Onset after death of daughter    Hearing loss Maternal Grandmother     Heart disease Maternal Grandmother     Arthritis " Paternal Grandmother     Diabetes Paternal Grandmother     Cancer Father         Lung Cancer/    Depression Father         Onset after death of wife    Early death Father          at 59 years old, cancer    Hypertension Father     Cancer Paternal Grandfather         Lung Cancer/    Cancer Paternal Aunt         Lung Cancer/    Cancer Paternal Uncle         Colon Cancer/    COPD Paternal Uncle     Early death Mother          at 47 years old, pneumonia and sepsis     Social History     Tobacco Use    Smoking status: Former     Packs/day: 0.50     Years: 20.00     Pack years: 10.00     Types: Vaping with nicotine, Cigarettes     Quit date: 2020     Years since quittin.8    Smokeless tobacco: Never    Tobacco comments:     Former smoker- 10 pack years, switched to vaping-half pod per day; quit vaping   Substance Use Topics    Alcohol use: Not Currently     Alcohol/week: 0.0 standard drinks     Comment: Only at special occasions, ie. Weddings    Drug use: Not Currently     Types: Marijuana     Comment: Occasional marijuana use, no problematic usage     Review of Systems  Constitutional: Denies fever/chills  Eyes: Endorses (transient) vision change  ENT: Denies sore throat or rhinorrhea   Respiratory: Denies shortness of breath or cough  Cardiovascular: Denies chest pain or palpitations  Gastrointestinal: Denies abdominal pain, nausea, or vomiting  Genitourinary: negative for dysuria and flank pain  Skin: Denies new rash or skin lesions   Allergic/Immunologic: Denies adverse reactions to current medications  Neurological: negative for headaches or dizziness  Musculoskeletal: Denies arthralgias    Physical Exam     Initial Vitals [11/15/22 1451]   BP Pulse Resp Temp SpO2   (!) 169/98 80 18 98.2 °F (36.8 °C) 99 %      MAP       --         Physical Exam  Gen:  No acute distress  CV:  Peripherally well-perfused.  Pulses 2+ bilaterally.  Lungs:  Normal respiratory  effort.  Abdomen:  Soft, non-tender, non-distended  Head/Neck:  Normocephalic.  Atraumatic. No TTP, AROM and PROM intact without pain  Neuro:  CN intact without deficit, SILT throughout B/L Upper & Lower Extremities  Pelvis: No TTP, Stable to direct anterior pressure over ASIS.    MSK:  BUE:  - AROM and PROM of the shoulders, elbows, wrists intact without pain   - AIN/PIN/Radial/Median/Ulnar Nerves assessed in isolation without deficit  - SILT throughout  - Radial & Ulnar arteries palpated 2+  - Capillary Refill <3s    BLE:  - AROM and PROM of hips,knees,ankles intact without pain.  - TA/EHL/Gastroc/FHL assessed in isolation without deficit  - SILT throughout  - DP and PT palpated  2+  - Capillary Refill <3s    ED Course   Procedures  Labs Reviewed   COMPREHENSIVE METABOLIC PANEL - Abnormal; Notable for the following components:       Result Value    ALT 90 (*)     All other components within normal limits   HIV 1 / 2 ANTIBODY    Narrative:     Release to patient->Immediate   HEPATITIS C ANTIBODY    Narrative:     Release to patient->Immediate   CBC W/ AUTO DIFFERENTIAL   TROPONIN I   PROTIME-INR   HEMOGLOBIN A1C   LIPID PANEL   TSH   LIPID PANEL    Narrative:     add on tsh and lipid panel per dr jayashree rosa/order#[id number] @18:59 11/15/2022  Add on GHGB per Jayashree Rosa MD   TSH    Narrative:     add on tsh and lipid panel per dr jayashree rosa/order#[id number] @18:59 11/15/2022  Add on GHGB per Jayashree Rosa MD   HEMOGLOBIN A1C    Narrative:     add on tsh and lipid panel per dr jayashree rosa/order#[id number] @18:59 11/15/2022  Add on GHGB per Jayashree Rosa MD   POCT GLUCOSE MONITORING CONTINUOUS        ECG Results              EKG 12-lead (Final result)  Result time 11/15/22 15:55:33      Final result by Interface, Lab [facility] (11/15/22 15:55:33)                   Narrative:    Test Reason : R47.01,    Vent. Rate : 074 BPM     Atrial Rate : 074 BPM     P-R Int : 182 ms           QRS Dur : 086 ms      QT Int : 368 ms       P-R-T Axes : 046 028 051 degrees     QTc Int : 408 ms    Normal sinus rhythm  Normal ECG  No previous ECGs available  Confirmed by LAURYN JENKINS MD ([id number]) on 11/15/2022 3:55:23 PM    Referred By: KRISTOFER   SELF           Confirmed By:LAURYN JENKINS MD                                  Imaging Results              MRI BRAIN WITHOUT CONTRAST (Final result)  Result time 11/15/22 19:42:05      Final result by Jordi Mccartney DO (11/15/22 19:42:05)                   Impression:      No intracranial abnormality.      Electronically signed by: Jordi Mccartney  Date:    11/15/2022  Time:    19:42               Narrative:    EXAMINATION:  MRI BRAIN WITHOUT CONTRAST    CLINICAL HISTORY:  Transient ischemic attack (TIA);    TECHNIQUE:  Multiplanar multisequence MR imaging of the brain was performed without intravenous contrast.    COMPARISON:  CTA head and neck from earlier the same date.    FINDINGS:  There is no evidence of restricted diffusion to suggest an acute infarction.    Ventricles are normal in size for age without evidence of hydrocephalus.  The brain parenchyma appears within normal limits.  No mass, hemorrhage, or recent or remote major vascular distribution infarct.  No extra-axial blood or fluid collections. Normal vascular flow voids.    Bone marrow signal intensity is normal. Paranasal sinuses and mastoid air cells are clear.                                       CTA STROKE MULTI-PHASE (Final result)  Result time 11/15/22 16:33:13      Final result by Javon Guerra MD (11/15/22 16:33:13)                   Impression:      No evidence of acute hemorrhage or major vascular distribution infarct.    Unremarkable CT arteriogram.  No evidence of high-grade stenosis or large vessel occlusion.    Mild cervical spondylosis.      Electronically signed by: Javon Guerra MD  Date:    11/15/2022  Time:    16:33               Narrative:    EXAMINATION:  CTA STROKE  MULTI-PHASE    CLINICAL HISTORY:  Transient ischemic attack (TIA);    TECHNIQUE:  Axial CT images obtained throughout the region of the head before and after the administration of intravenous contrast.  CT angiogram was performed through the cervical and intracranial vasculature during the IV bolus administration of 100mL of Omnipaque 350.  Two additional phases through the intracranial vasculature via multiphase technique.  Multiplanar MPR and MIP reformats were performed.    CT source data was analyzed using artificial intelligence software for detection of a large vessel occlusions (LVO) in order to enable computer assisted triage notification and aid clinical stroke decision making.    COMPARISON:  None    FINDINGS:  The ventricles are normal in size without evidence of hydrocephalus.    The brain appears within normal limits. No parenchymal mass, hemorrhage, edema or major vascular distribution infarct.    No extra-axial blood or fluid collections.    The cranium appears intact.    Mastoid air cells and paranasal sinuses are essentially clear.    Mild cervical spondylosis.      CTA:    The aortic arch maintains a normal branching pattern.    The common and internal carotid arteries are normal in course and caliber. No significant stenosis in either carotid bifurcation.    The vertebral origins are patent. The cervical vertebral arteries are normal in course and caliber. Vertebrobasilar system is within normal limits without focal abnormality.    The anterior, middle, and posterior cerebral arteries are within normal limits, without evidence of significant stenosis, focal occlusion, or intracranial aneurysm formation.                                       Medications   iohexoL (OMNIPAQUE 350) injection 100 mL (100 mLs Intravenous Given 11/15/22 1615)     Medical Decision Making:   History:   Old Medical Records: I decided to obtain old medical records.  Initial Assessment:   Patient is a healthy 42-year-old  male, who presents following an episode of dysarthria and left-sided weakness that occurred 2 hours PTA.  BP elevated to 160/90s on arrival; vitals otherwise stable.  Physical exam revealing no neuro deficits.  Differential Diagnosis:   - TIA most likely based on patient's description of symptoms  - Low suspicion for Stroke and Cardiac arrhythmia  Clinical Tests:   Lab Tests: Ordered and Reviewed  The following lab test(s) were unremarkable: CBC, Troponin, PTT and CMP  Radiological Study: Ordered and Reviewed  Medical Tests: Ordered and Reviewed  ED Management:  EKG with NSR and no acute abnormalities.  CTA showing no acute abnormalities and no significant large vessel stenosis or occlusion.  Vascular Neurology consulted and recommended overnight observation with MRI of brain.    Other:   I have discussed this case with another health care provider.          Attending Attestation:             Attending ED Notes:   Attending Note:  I have seen the patient, have repeated the key portions of the history and physical, reviewed and agree with the medical documentation, and supervised and managed the medical care of the patient. Additionally, I was present for the critical portion of any procedure(s) performed.    42-year-old male presenting today with sudden onset left-sided weakness, numbness, slurred speech and right eye peripheral vision loss- symptoms resolved spontaneously after several minutes.    Vital signs reviewed, mildly hypertensive otherwise stable.  Labs and imaging negative.  Discussed with vascular Neurology, they evaluated patient and will place patient observation for MRI, further stroke workup for suspected TIA    YESSICA Reis MD  Staff ED Physician  11/15/2022 8:08 PM          ED Course as of 11/15/22 1700   Tue Nov 15, 2022   1616 D/w Vascular Neuro regarding imaging.  Agree with CTA multiphase [GM]   1641 Impression:     No evidence of acute hemorrhage or major vascular distribution infarct.      Unremarkable CT arteriogram.  No evidence of high-grade stenosis or large vessel occlusion.     Mild cervical spondylosis.        Electronically signed by: Javon Guerra MD  Date:                                            11/15/2022  Time:                                           16:33 [GM]      ED Course User Index  [GM] Danay Reis MD                 Clinical Impression:   Final diagnoses:  [R20.2] Paresthesias               Danay Reis MD  11/15/22 2008

## 2022-11-15 NOTE — SUBJECTIVE & OBJECTIVE
No past medical history on file.  No past surgical history on file.  Family History   Problem Relation Age of Onset    Alcohol abuse Paternal Uncle     Cancer Paternal Uncle         Lung Cancer/    Early death Paternal Uncle          at 50 years old, cancer    Alcohol abuse Maternal Uncle     COPD Maternal Uncle     Alcohol abuse Maternal Uncle     Early death Maternal Uncle          at , alcohol related liver disease    Alcohol abuse Maternal Grandfather     Early death Maternal Grandfather          in 50's, myocardial infarction    Heart disease Maternal Grandfather     Arthritis Maternal Grandmother     COPD Maternal Grandmother     Depression Maternal Grandmother         Onset after death of daughter    Hearing loss Maternal Grandmother     Heart disease Maternal Grandmother     Arthritis Paternal Grandmother     Diabetes Paternal Grandmother     Cancer Father         Lung Cancer/    Depression Father         Onset after death of wife    Early death Father          at 59 years old, cancer    Hypertension Father     Cancer Paternal Grandfather         Lung Cancer/    Cancer Paternal Aunt         Lung Cancer/    Cancer Paternal Uncle         Colon Cancer/    COPD Paternal Uncle     Early death Mother          at 47 years old, pneumonia and sepsis     Social History     Tobacco Use    Smoking status: Former     Packs/day: 0.50     Years: 20.00     Pack years: 10.00     Types: Vaping with nicotine, Cigarettes     Quit date: 2020     Years since quittin.8    Smokeless tobacco: Never    Tobacco comments:     Former smoker- 10 pack years, switched to vaping-half pod per day; quit vaping   Substance Use Topics    Alcohol use: Not Currently     Alcohol/week: 0.0 standard drinks     Comment: Only at special occasions, ie. Weddings    Drug use: Not Currently     Types: Marijuana     Comment: Occasional marijuana use, no problematic usage      Review of patient's allergies indicates:  No Known Allergies    Medications: I have reviewed the current medication administration record.    (Not in a hospital admission)      Review of Systems   Constitutional:  Negative for appetite change, chills and fever.   HENT:  Negative for drooling and trouble swallowing.    Eyes:  Positive for visual disturbance (brief blurry vision right visual field). Negative for pain.   Respiratory:  Negative for cough and shortness of breath.    Cardiovascular:  Negative for chest pain, palpitations and leg swelling.   Gastrointestinal:  Negative for abdominal pain, blood in stool, diarrhea, nausea and vomiting.   Genitourinary:  Negative for difficulty urinating, dysuria and hematuria.   Musculoskeletal:  Negative for gait problem and neck stiffness.   Skin:  Negative for color change and rash.   Neurological:  Positive for speech difficulty, weakness and numbness. Negative for dizziness, facial asymmetry, light-headedness and headaches.   Psychiatric/Behavioral:  Negative for agitation and confusion.    Objective:     Vital Signs (Most Recent):  Temp: 98.3 °F (36.8 °C) (11/15/22 1728)  Pulse: 60 (11/15/22 1641)  Resp: 18 (11/15/22 1451)  BP: (!) 140/86 (11/15/22 1641)  SpO2: 98 % (11/15/22 1641)    Vital Signs Range (Last 24H):  Temp:  [98.2 °F (36.8 °C)-98.3 °F (36.8 °C)]   Pulse:  [60-80]   Resp:  [18]   BP: (140-169)/(86-98)   SpO2:  [98 %-99 %]     Physical Exam  Constitutional:       General: He is not in acute distress.  HENT:      Head: Normocephalic and atraumatic.      Right Ear: External ear normal.      Left Ear: External ear normal.   Cardiovascular:      Rate and Rhythm: Normal rate and regular rhythm.   Pulmonary:      Effort: Pulmonary effort is normal. No respiratory distress.   Abdominal:      General: There is no distension.      Tenderness: There is no guarding.   Musculoskeletal:      Right lower leg: No edema.      Left lower leg: No edema.   Skin:      General: Skin is warm and dry.   Neurological:      Mental Status: He is alert.   Psychiatric:         Mood and Affect: Mood normal.         Behavior: Behavior normal.       Neurological Exam:   LOC: alert  Attention Span: Good   Language: No aphasia  Articulation: No dysarthria  Orientation: Person, Place, Time   Visual Fields: Full  EOM (CN III, IV, VI): Full/intact  Facial Sensation (CN V): Normal  Facial Movement (CN VII): Symmetric facial expression    Motor: Arm left  Normal 5/5  Leg left  Normal 5/5  Arm right  Normal 5/5  Leg right Normal 5/5  Cerebellum: No evidence of appendicular or axial ataxia  Sensation: Intact to light touch, temperature  Tone: Normal tone throughout      Laboratory:  CMP:   Recent Labs   Lab 11/15/22  1524   CALCIUM 9.6   ALBUMIN 4.6   PROT 7.6      K 3.6   CO2 27      BUN 11   CREATININE 1.1   ALKPHOS 68   ALT 90*   AST 38   BILITOT 0.6     CBC:   Recent Labs   Lab 11/15/22  1524   WBC 6.31   RBC 4.71   HGB 14.3   HCT 41.5      MCV 88   MCH 30.4   MCHC 34.5     Lipid Panel: No results for input(s): CHOL, LDLCALC, HDL, TRIG in the last 168 hours.  Coagulation:   Recent Labs   Lab 11/15/22  1524   INR 1.0     Hgb A1C: No results for input(s): HGBA1C in the last 168 hours.  TSH: No results for input(s): TSH in the last 168 hours.    Diagnostic Results:      Brain imaging:  MRI Pending    Vessel Imagin/15/2022 CTA head and neck  No high-grade stenosis or occlusion.  No intracranial atherosclerotic disease    Cardiac Evaluation:   TTE pending

## 2022-11-15 NOTE — HPI
41 y/o male with no significant medical history (former smoker; currently vapes) who presented to the ER with complaints of left hand tingling, slurred speech, blurry vision, and slight left calf weakness.  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  Immediately following this, his speech suddenly became very slurred to the point where the patient he was working with could not understand what he was trying to say.  He also noticed his right visual field was blurry and his left calf felt weak and tingling as well.  Symptoms lasted only a minute or two then completely resolved.  He has never had these symptoms before.  He has no history of stroke or TIA.  There were no known triggers. He has no history of migraines.  He denies any recently drug use but does report to vaping THC occasionally.  Reports no medical history and does not take any medications.  No significant family history.

## 2022-11-15 NOTE — ASSESSMENT & PLAN NOTE
41 y/o male with no significant medical history (former smoker; currently vapes) who presented with left hand tingling, dysarthria, left calf tingling/weakness, and blurry vision right visual field.  Symptoms lasted 1-2 minutes now completely resolved.  No additional episodes since 1:40pm today.  CTA head and neck with no high-grade stenosis or occlusion.  Left hand/leg weakness and dysarthria could fit vascular distribution concerning for small vessel TIA.  Unclear significance of the blurry vision noted in the right visual field only.  Patient with no history of migraines.  Denies HA with this event.  Reasonable to pursue stroke workup to identify any underlying risk factors and rule out possible small subcortical infarct.    Recommendations  -ASA 81mg daily with Plavix 75mg x 21 days then monotherapy with ASA  -Atorvastatin 40mg daily for goal LDL < 70  -PT/OT/SLP considered - at this time there are no deficits or concerns will defer at this time  -MRI brain, TTE, A1c TSH  -SBP < 220 until imaging obtained and no acute infarct noted

## 2022-11-15 NOTE — ED NOTES
Patient identifiers for Sukhwinder Lyon 42 y.o. male checked and correct.  Chief Complaint   Patient presents with    Multiple complaints     At 1345 had trouble getting words out and was muffled, l hand got tingly and numb, all back to normal now     No past medical history on file.  Allergies reported: Review of patient's allergies indicates:  No Known Allergies      LOC: Patient is awake, alert, and aware of environment with an appropriate affect. Patient is oriented x 4 and speaking appropriately.  APPEARANCE: Patient resting comfortably and in no acute distress. Patient is clean and well groomed, patient's clothing is properly fastened.  HEENT: Pt presents with surgical mask on.   SKIN: The skin is warm and dry. Patient has normal skin turgor and moist mucus membranes.   MUSKULOSKELETAL: Patient is moving all extremities well, no obvious deformities noted. Pulses intact.   RESPIRATORY: Airway is open and patent. Respirations are spontaneous and non-labored with normal effort and rate.  CARDIAC: Patient has a normal rate and rhythm. 80 on cardiac monitor. No peripheral edema noted.   ABDOMEN: No distention noted. Soft and non-tender upon palpation.  NEUROLOGICAL: Facial expression is symmetrical. Hand grasps are equal bilaterally. Normal sensation in all extremities when touched with finger.

## 2022-11-15 NOTE — FIRST PROVIDER EVALUATION
" Emergency Department TeleTriage Encounter Note      CHIEF COMPLAINT    Chief Complaint   Patient presents with    Multiple complaints     At 1345 had trouble getting words out and was muffled, l hand got tingly and numb, all back to normal now       VITAL SIGNS   Initial Vitals [11/15/22 1451]   BP Pulse Resp Temp SpO2   (!) 169/98 80 18 98.2 °F (36.8 °C) 99 %      MAP       --            ALLERGIES    Review of patient's allergies indicates:  No Known Allergies    PROVIDER TRIAGE NOTE  41 y/o male without significant PMH presents with complaint of hand tingling and changes in speech.    Patient reports at 1:40 pm he was at work - he reports tingling left hand, blurry vision to right peripheral vision, he reports "garbled speech" symptoms resolved after 2-3 minutes.    He reports left calf weakness, but was able to walk.  He denies recent fall or injury, dizziness or lightheadedness, nausea, vomiting, fever.    Will order labs and head CT pending ED provider evaluation.   Patient currently asymptomatic.     He denies any new medications.      Initial orders will be placed and care will be transferred to an alternate provider when patient is roomed for a full evaluation. Any additional orders and the final disposition will be determined by that provider.         ORDERS  Labs Reviewed   HIV 1 / 2 ANTIBODY   HEPATITIS C ANTIBODY       ED Orders (720h ago, onward)      Start Ordered     Status Ordering Provider    11/15/22 1454 11/15/22 1454  HIV 1/2 Ag/Ab (4th Gen)  STAT         Ordered KAY WATTS    11/15/22 1454 11/15/22 1454  Hepatitis C Antibody  STAT         Ordered KAY WATTS              Virtual Visit Note: The provider triage portion of this emergency department evaluation and documentation was performed via [employer], a HIPAA-compliant telemedicine application, in concert with a tele-presenter in the room. A face to face patient evaluation with one of my colleagues will occur once the patient is " placed in an emergency department room.      DISCLAIMER: This note was prepared with [employer] voice recognition transcription software. Garbled syntax, mangled pronouns, and other bizarre constructions may be attributed to that software system.

## 2022-11-15 NOTE — H&P
[facility] Emergency Dept  Vascular Neurology  [facility]  History & Physical    Inpatient consult to Vascular (Stroke) Neurology  Consult performed by: Mira Francis NP  Consult ordered by: Wesley Isaac MD        Assessment/Plan:     Patient is a 42 y.o. year old male with:    * Transient neurological symptoms  41 y/o male with no significant medical history (former smoker; currently vapes) who presented with left hand tingling, dysarthria, left calf tingling/weakness, and blurry vision right visual field.  Symptoms lasted 1-2 minutes now completely resolved.  No additional episodes since 1:40pm today.  CTA head and neck with no high-grade stenosis or occlusion.  Left hand/leg weakness and dysarthria could fit vascular distribution concerning for small vessel TIA.  Unclear significance of the blurry vision noted in the right visual field only.  Patient with no history of migraines.  Denies HA with this event.  Reasonable to pursue stroke workup to identify any underlying risk factors and rule out possible small subcortical infarct.    Recommendations  -ASA 81mg daily with Plavix 75mg x 21 days then monotherapy with ASA  -Atorvastatin 40mg daily for goal LDL < 70  -PT/OT/SLP considered - at this time there are no deficits or concerns will defer at this time  -MRI brain, TTE, A1c TSH  -SBP < 220 until imaging obtained and no acute infarct noted    Tobacco use  -Stroke risk factor  -Nicotine patch PRN        Dysarthria and anarthria  -Currently resolved      STROKE DOCUMENTATION          NIH Scale:  1a. Level of Consciousness: 0-->Alert, keenly responsive  1b. LOC Questions: 0-->Answers both questions correctly  1c. LOC Commands: 0-->Performs both tasks correctly  2. Best Gaze: 0-->Normal  3. Visual: 0-->No visual loss  4. Facial Palsy: 0-->Normal symmetrical movements  5a. Motor Arm, Left: 0-->No drift, limb holds 90 (or 45) degrees for full 10 secs  5b. Motor Arm, Right: 0-->No drift, limb  holds 90 (or 45) degrees for full 10 secs  6a. Motor Leg, Left: 0-->No drift, leg holds 30 degree position for full 5 secs  6b. Motor Leg, Right: 0-->No drift, leg holds 30 degree position for full 5 secs  7. Limb Ataxia: 0-->Absent  8. Sensory: 0-->Normal, no sensory loss  9. Best Language: 0-->No aphasia, normal  10. Dysarthria: 0-->Normal  11. Extinction and Inattention (formerly Neglect): 0-->No abnormality  Total (NIH Stroke Scale): 0     Modified [place] Score: 0  [place] Coma Scale:    ABCD2 Score:    [id number] Score:   HAS -BLED Score:   ICH Score:   Hunt & Joyce Classification:      Thrombolysis Candidate? No, Patient back to neurological baseline     Delays to Thrombolysis?  Not Applicable    Interventional Revascularization Candidate?   Is the patient eligible for mechanical endovascular reperfusion (SULMA)?  No; No large vessel occlusion identified on imaging     Delays to Thrombectomy? Not Applicable    Hemorrhagic change of an Ischemic Stroke: Does this patient have an ischemic stroke with hemorrhagic changes? No         Subjective:     History of Present Illness:  41 y/o male with no significant medical history (former smoker; currently vapes) who presented to the ER with complaints of left hand tingling, slurred speech, blurry vision, and slight left calf weakness.  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  Immediately following this, his speech suddenly became very slurred to the point where the patient he was working with could not understand what he was trying to say.  He also noticed his right visual field was blurry and his left calf felt weak and tingling as well.  Symptoms lasted only a minute or two then completely resolved.  He has never had these symptoms before.  He has no history of stroke or TIA.  There were no known triggers. He has no history of migraines.  He denies any recently  drug use but does report to vaping THC occasionally.  Reports no medical history and does not take any medications.  No significant family history.          No past medical history on file.  No past surgical history on file.  Family History   Problem Relation Age of Onset    Alcohol abuse Paternal Uncle     Cancer Paternal Uncle         Lung Cancer/    Early death Paternal Uncle          at 50 years old, cancer    Alcohol abuse Maternal Uncle     COPD Maternal Uncle     Alcohol abuse Maternal Uncle     Early death Maternal Uncle          at , alcohol related liver disease    Alcohol abuse Maternal Grandfather     Early death Maternal Grandfather          in 50's, myocardial infarction    Heart disease Maternal Grandfather     Arthritis Maternal Grandmother     COPD Maternal Grandmother     Depression Maternal Grandmother         Onset after death of daughter    Hearing loss Maternal Grandmother     Heart disease Maternal Grandmother     Arthritis Paternal Grandmother     Diabetes Paternal Grandmother     Cancer Father         Lung Cancer/    Depression Father         Onset after death of wife    Early death Father          at 59 years old, cancer    Hypertension Father     Cancer Paternal Grandfather         Lung Cancer/    Cancer Paternal Aunt         Lung Cancer/    Cancer Paternal Uncle         Colon Cancer/    COPD Paternal Uncle     Early death Mother          at 47 years old, pneumonia and sepsis     Social History     Tobacco Use    Smoking status: Former     Packs/day: 0.50     Years: 20.00     Pack years: 10.00     Types: Vaping with nicotine, Cigarettes     Quit date: 2020     Years since quittin.8    Smokeless tobacco: Never    Tobacco comments:     Former smoker- 10 pack years, switched to vaping-half pod per day; quit vaping   Substance Use Topics    Alcohol use: Not Currently     Alcohol/week:  0.0 standard drinks     Comment: Only at special occasions, ie. Weddings    Drug use: Not Currently     Types: Marijuana     Comment: Occasional marijuana use, no problematic usage     Review of patient's allergies indicates:  No Known Allergies    Medications: I have reviewed the current medication administration record.    (Not in a hospital admission)      Review of Systems   Constitutional:  Negative for appetite change, chills and fever.   HENT:  Negative for drooling and trouble swallowing.    Eyes:  Positive for visual disturbance (brief blurry vision right visual field). Negative for pain.   Respiratory:  Negative for cough and shortness of breath.    Cardiovascular:  Negative for chest pain, palpitations and leg swelling.   Gastrointestinal:  Negative for abdominal pain, blood in stool, diarrhea, nausea and vomiting.   Genitourinary:  Negative for difficulty urinating, dysuria and hematuria.   Musculoskeletal:  Negative for gait problem and neck stiffness.   Skin:  Negative for color change and rash.   Neurological:  Positive for speech difficulty, weakness and numbness. Negative for dizziness, facial asymmetry, light-headedness and headaches.   Psychiatric/Behavioral:  Negative for agitation and confusion.    Objective:     Vital Signs (Most Recent):  Temp: 98.3 °F (36.8 °C) (11/15/22 1728)  Pulse: 60 (11/15/22 1641)  Resp: 18 (11/15/22 1451)  BP: (!) 140/86 (11/15/22 1641)  SpO2: 98 % (11/15/22 1641)    Vital Signs Range (Last 24H):  Temp:  [98.2 °F (36.8 °C)-98.3 °F (36.8 °C)]   Pulse:  [60-80]   Resp:  [18]   BP: (140-169)/(86-98)   SpO2:  [98 %-99 %]     Physical Exam  Constitutional:       General: He is not in acute distress.  HENT:      Head: Normocephalic and atraumatic.      Right Ear: External ear normal.      Left Ear: External ear normal.   Cardiovascular:      Rate and Rhythm: Normal rate and regular rhythm.   Pulmonary:      Effort: Pulmonary effort is normal. No respiratory distress.    Abdominal:      General: There is no distension.      Tenderness: There is no guarding.   Musculoskeletal:      Right lower leg: No edema.      Left lower leg: No edema.   Skin:     General: Skin is warm and dry.   Neurological:      Mental Status: He is alert.   Psychiatric:         Mood and Affect: Mood normal.         Behavior: Behavior normal.       Neurological Exam:   LOC: alert  Attention Span: Good   Language: No aphasia  Articulation: No dysarthria  Orientation: Person, Place, Time   Visual Fields: Full  EOM (CN III, IV, VI): Full/intact  Facial Sensation (CN V): Normal  Facial Movement (CN VII): Symmetric facial expression    Motor: Arm left  Normal 5/5  Leg left  Normal 5/5  Arm right  Normal 5/5  Leg right Normal 5/5  Cerebellum: No evidence of appendicular or axial ataxia  Sensation: Intact to light touch, temperature  Tone: Normal tone throughout      Laboratory:  CMP:   Recent Labs   Lab 11/15/22  1524   CALCIUM 9.6   ALBUMIN 4.6   PROT 7.6      K 3.6   CO2 27      BUN 11   CREATININE 1.1   ALKPHOS 68   ALT 90*   AST 38   BILITOT 0.6     CBC:   Recent Labs   Lab 11/15/22  1524   WBC 6.31   RBC 4.71   HGB 14.3   HCT 41.5      MCV 88   MCH 30.4   MCHC 34.5     Lipid Panel: No results for input(s): CHOL, LDLCALC, HDL, TRIG in the last 168 hours.  Coagulation:   Recent Labs   Lab 11/15/22  1524   INR 1.0     Hgb A1C: No results for input(s): HGBA1C in the last 168 hours.  TSH: No results for input(s): TSH in the last 168 hours.    Diagnostic Results:      Brain imaging:  MRI Pending    Vessel Imagin/15/2022 CTA head and neck  No high-grade stenosis or occlusion.  No intracranial atherosclerotic disease    Cardiac Evaluation:   TTE pending          Mira Francis NP  [facility]  Department of Vascular Neurology   [facility] - [facility] Dept

## 2022-11-16 VITALS
RESPIRATION RATE: 13 BRPM | TEMPERATURE: 97 F | HEART RATE: 66 BPM | HEIGHT: 72 IN | SYSTOLIC BLOOD PRESSURE: 118 MMHG | BODY MASS INDEX: 31.02 KG/M2 | WEIGHT: 229 LBS | DIASTOLIC BLOOD PRESSURE: 84 MMHG | OXYGEN SATURATION: 95 %

## 2022-11-16 PROBLEM — Q24.8 INTERATRIAL CARDIAC SHUNT: Status: ACTIVE | Noted: 2022-11-16

## 2022-11-16 LAB
ALBUMIN SERPL BCP-MCNC: 4.2 G/DL (ref 3.5–5.2)
ALP SERPL-CCNC: 62 U/L (ref 55–135)
ALT SERPL W/O P-5'-P-CCNC: 86 U/L (ref 10–44)
ANION GAP SERPL CALC-SCNC: 8 MMOL/L (ref 8–16)
APTT BLDCRRT: 24.9 SEC (ref 21–32)
ASCENDING AORTA: 2.9 CM
AST SERPL-CCNC: 35 U/L (ref 10–40)
AV INDEX (PROSTH): 0.89
AV MEAN GRADIENT: 2 MMHG
AV PEAK GRADIENT: 4 MMHG
AV VALVE AREA: 3.78 CM2
AV VELOCITY RATIO: 0.86
BASOPHILS # BLD AUTO: 0.05 K/UL (ref 0–0.2)
BASOPHILS NFR BLD: 0.8 % (ref 0–1.9)
BILIRUB SERPL-MCNC: 0.6 MG/DL (ref 0.1–1)
BSA FOR ECHO PROCEDURE: 2.3 M2
BUN SERPL-MCNC: 13 MG/DL (ref 6–20)
CALCIUM SERPL-MCNC: 9.9 MG/DL (ref 8.7–10.5)
CHLORIDE SERPL-SCNC: 106 MMOL/L (ref 95–110)
CK MB SERPL-MCNC: <1 NG/ML (ref 0.1–6.5)
CK MB SERPL-RTO: NORMAL % (ref 0–5)
CK SERPL-CCNC: 102 U/L (ref 20–200)
CO2 SERPL-SCNC: 27 MMOL/L (ref 23–29)
CREAT SERPL-MCNC: 1.1 MG/DL (ref 0.5–1.4)
CV ECHO LV RWT: 0.36 CM
DIFFERENTIAL METHOD: ABNORMAL
DOP CALC AO PEAK VEL: 0.96 M/S
DOP CALC AO VTI: 19.9 CM
DOP CALC LVOT AREA: 4.2 CM2
DOP CALC LVOT DIAMETER: 2.32 CM
DOP CALC LVOT PEAK VEL: 0.83 M/S
DOP CALC LVOT STROKE VOLUME: 75.21 CM3
DOP CALCLVOT PEAK VEL VTI: 17.8 CM
E WAVE DECELERATION TIME: 197.51 MSEC
E/A RATIO: 1.29
E/E' RATIO: 4.26 M/S
ECHO LV POSTERIOR WALL: 0.83 CM (ref 0.6–1.1)
EJECTION FRACTION: 60 %
EOSINOPHIL # BLD AUTO: 0 K/UL (ref 0–0.5)
EOSINOPHIL NFR BLD: 0.2 % (ref 0–8)
ERYTHROCYTE [DISTWIDTH] IN BLOOD BY AUTOMATED COUNT: 12 % (ref 11.5–14.5)
EST. GFR  (NO RACE VARIABLE): >60 ML/MIN/1.73 M^2
FRACTIONAL SHORTENING: 42 % (ref 28–44)
GLUCOSE SERPL-MCNC: 101 MG/DL (ref 70–110)
HCT VFR BLD AUTO: 40.2 % (ref 40–54)
HGB BLD-MCNC: 13.8 G/DL (ref 14–18)
IMM GRANULOCYTES # BLD AUTO: 0.02 K/UL (ref 0–0.04)
IMM GRANULOCYTES NFR BLD AUTO: 0.3 % (ref 0–0.5)
INR PPP: 1.1 (ref 0.8–1.2)
INTERVENTRICULAR SEPTUM: 0.73 CM (ref 0.6–1.1)
LA MAJOR: 4.35 CM
LA MINOR: 4.32 CM
LA WIDTH: 3.77 CM
LEFT ATRIUM SIZE: 2.95 CM
LEFT ATRIUM VOLUME INDEX MOD: 16.5 ML/M2
LEFT ATRIUM VOLUME INDEX: 18.1 ML/M2
LEFT ATRIUM VOLUME MOD: 37.38 CM3
LEFT ATRIUM VOLUME: 40.98 CM3
LEFT INTERNAL DIMENSION IN SYSTOLE: 2.66 CM (ref 2.1–4)
LEFT VENTRICLE DIASTOLIC VOLUME INDEX: 41.95 ML/M2
LEFT VENTRICLE DIASTOLIC VOLUME: 94.81 ML
LEFT VENTRICLE MASS INDEX: 50 G/M2
LEFT VENTRICLE SYSTOLIC VOLUME INDEX: 11.6 ML/M2
LEFT VENTRICLE SYSTOLIC VOLUME: 26.13 ML
LEFT VENTRICULAR INTERNAL DIMENSION IN DIASTOLE: 4.55 CM (ref 3.5–6)
LEFT VENTRICULAR MASS: 112.01 G
LV LATERAL E/E' RATIO: 3.77 M/S
LV SEPTAL E/E' RATIO: 4.9 M/S
LYMPHOCYTES # BLD AUTO: 3 K/UL (ref 1–4.8)
LYMPHOCYTES NFR BLD: 45.8 % (ref 18–48)
MAGNESIUM SERPL-MCNC: 1.9 MG/DL (ref 1.6–2.6)
MCH RBC QN AUTO: 30.8 PG (ref 27–31)
MCHC RBC AUTO-ENTMCNC: 34.3 G/DL (ref 32–36)
MCV RBC AUTO: 90 FL (ref 82–98)
MONOCYTES # BLD AUTO: 0.7 K/UL (ref 0.3–1)
MONOCYTES NFR BLD: 10 % (ref 4–15)
MV PEAK A VEL: 0.38 M/S
MV PEAK E VEL: 0.49 M/S
MV STENOSIS PRESSURE HALF TIME: 57.28 MS
MV VALVE AREA P 1/2 METHOD: 3.84 CM2
NEUTROPHILS # BLD AUTO: 2.8 K/UL (ref 1.8–7.7)
NEUTROPHILS NFR BLD: 42.9 % (ref 38–73)
NRBC BLD-RTO: 0 /100 WBC
PHOSPHATE SERPL-MCNC: 3.9 MG/DL (ref 2.7–4.5)
PISA TR MAX VEL: 2.16 M/S
PLATELET # BLD AUTO: 257 K/UL (ref 150–450)
PMV BLD AUTO: 10 FL (ref 9.2–12.9)
POTASSIUM SERPL-SCNC: 3.8 MMOL/L (ref 3.5–5.1)
PROT SERPL-MCNC: 7 G/DL (ref 6–8.4)
PROTHROMBIN TIME: 11 SEC (ref 9–12.5)
RA MAJOR: 3.99 CM
RA PRESSURE: 3 MMHG
RA WIDTH: 3.62 CM
RBC # BLD AUTO: 4.48 M/UL (ref 4.6–6.2)
RIGHT VENTRICULAR END-DIASTOLIC DIMENSION: 4.23 CM
RV TISSUE DOPPLER FREE WALL SYSTOLIC VELOCITY 1 (APICAL 4 CHAMBER VIEW): 10.19 CM/S
SINUS: 3.39 CM
SODIUM SERPL-SCNC: 141 MMOL/L (ref 136–145)
STJ: 2.88 CM
TDI LATERAL: 0.13 M/S
TDI SEPTAL: 0.1 M/S
TDI: 0.12 M/S
TR MAX PG: 19 MMHG
TRICUSPID ANNULAR PLANE SYSTOLIC EXCURSION: 1.66 CM
TROPONIN I SERPL DL<=0.01 NG/ML-MCNC: <0.006 NG/ML (ref 0–0.03)
TV REST PULMONARY ARTERY PRESSURE: 22 MMHG
WBC # BLD AUTO: 6.49 K/UL (ref 3.9–12.7)

## 2022-11-16 PROCEDURE — 99204 OFFICE O/P NEW MOD 45 MIN: CPT | Mod: ,,, | Performed by: STUDENT IN AN ORGANIZED HEALTH CARE EDUCATION/TRAINING PROGRAM

## 2022-11-16 PROCEDURE — G0378 HOSPITAL OBSERVATION PER HR: HCPCS

## 2022-11-16 PROCEDURE — 84484 ASSAY OF TROPONIN QUANT: CPT | Performed by: NURSE PRACTITIONER

## 2022-11-16 PROCEDURE — 36415 COLL VENOUS BLD VENIPUNCTURE: CPT | Performed by: NURSE PRACTITIONER

## 2022-11-16 PROCEDURE — 99214 PR OFFICE/OUTPT VISIT, EST, LEVL IV, 30-39 MIN: ICD-10-PCS | Mod: ,,, | Performed by: STUDENT IN AN ORGANIZED HEALTH CARE EDUCATION/TRAINING PROGRAM

## 2022-11-16 PROCEDURE — 99214 OFFICE O/P EST MOD 30 MIN: CPT | Mod: ,,, | Performed by: STUDENT IN AN ORGANIZED HEALTH CARE EDUCATION/TRAINING PROGRAM

## 2022-11-16 PROCEDURE — 82553 CREATINE MB FRACTION: CPT | Performed by: NURSE PRACTITIONER

## 2022-11-16 PROCEDURE — 83735 ASSAY OF MAGNESIUM: CPT | Performed by: NURSE PRACTITIONER

## 2022-11-16 PROCEDURE — 25000003 PHARM REV CODE 250: Performed by: NURSE PRACTITIONER

## 2022-11-16 PROCEDURE — 99204 PR OFFICE/OUTPT VISIT, NEW, LEVL IV, 45-59 MIN: ICD-10-PCS | Mod: ,,, | Performed by: STUDENT IN AN ORGANIZED HEALTH CARE EDUCATION/TRAINING PROGRAM

## 2022-11-16 PROCEDURE — 85025 COMPLETE CBC W/AUTO DIFF WBC: CPT | Performed by: NURSE PRACTITIONER

## 2022-11-16 PROCEDURE — 85730 THROMBOPLASTIN TIME PARTIAL: CPT | Performed by: NURSE PRACTITIONER

## 2022-11-16 PROCEDURE — 85610 PROTHROMBIN TIME: CPT | Performed by: NURSE PRACTITIONER

## 2022-11-16 PROCEDURE — 84100 ASSAY OF PHOSPHORUS: CPT | Performed by: NURSE PRACTITIONER

## 2022-11-16 PROCEDURE — 80053 COMPREHEN METABOLIC PANEL: CPT | Performed by: NURSE PRACTITIONER

## 2022-11-16 RX ORDER — ATORVASTATIN CALCIUM 40 MG/1
40 TABLET, FILM COATED ORAL DAILY
Qty: 90 TABLET | Refills: 3 | Status: SHIPPED | OUTPATIENT
Start: 2022-11-17 | End: 2023-11-17

## 2022-11-16 RX ORDER — CLOPIDOGREL BISULFATE 75 MG/1
75 TABLET ORAL DAILY
Qty: 20 TABLET | Refills: 0 | Status: SHIPPED | OUTPATIENT
Start: 2022-11-17 | End: 2023-01-06

## 2022-11-16 RX ORDER — ASPIRIN 81 MG/1
81 TABLET ORAL DAILY
Qty: 90 TABLET | Refills: 3 | Status: SHIPPED | OUTPATIENT
Start: 2022-11-17 | End: 2023-11-17

## 2022-11-16 RX ADMIN — ATORVASTATIN CALCIUM 40 MG: 20 TABLET, FILM COATED ORAL at 09:11

## 2022-11-16 RX ADMIN — ASPIRIN 81 MG: 81 TABLET, COATED ORAL at 09:11

## 2022-11-16 RX ADMIN — CLOPIDOGREL BISULFATE 75 MG: 75 TABLET ORAL at 09:11

## 2022-11-16 NOTE — DISCHARGE SUMMARY
[facility] - Neurosurgery ([facility])  Vascular Neurology  Comprehensive Stroke Center  Discharge Summary     Summary:     Admit Date: 11/15/2022  3:33 PM    Discharge Date and Time:  11/16/2022 12:18 PM    Attending Physician: Jayashree Rosa MD     Discharge Provider: Donavon Sethi MD    History of Present Illness: 43 y/o male with no significant medical history (former smoker; currently vapes) who presented to the ER with complaints of left hand tingling, slurred speech, blurry vision, and slight left calf weakness.  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  Immediately following this, his speech suddenly became very slurred to the point where the patient he was working with could not understand what he was trying to say.  He also noticed his right visual field was blurry and his left calf felt weak and tingling as well.  Symptoms lasted only a minute or two then completely resolved.  He has never had these symptoms before.  He has no history of stroke or TIA.  There were no known triggers. He has no history of migraines.  He denies any recently drug use but does report to vaping THC occasionally.  Reports no medical history and does not take any medications.  No significant family history.    Hospital Course (synopsis of major diagnoses, care, treatment, and services provided during the course of the hospital stay): 11/16/2022 - no acute events overnight, patient remains at his baseline neurological functioning.  Per  the patient is at baseline and has normal speech.  Patient educated regarding his vaping and agreed that he will quit and stop and has support from his .  Patient is otherwise stable for discharge, and will go home with dual antiplatelet for 21 days and then stop plavix and continue aspirin for lifelong therapy.  Patient will follow up outpatient with Vascular Neurology as out  patient.  TTE revealed an intracardiac shunt.  Outpatient CT chest without contrast has been placed and will be followed by Pulmonary.    Goals of Care Treatment Preferences:  Code Status: Full Code    Stroke Etiology: TIA    STROKE DOCUMENTATION       NIH Scale:  Interval: 7 days or at discharge (whichever comes first)  1a. Level of Consciousness: 0-->Alert, keenly responsive  1b. LOC Questions: 0-->Answers both questions correctly  1c. LOC Commands: 0-->Performs both tasks correctly  2. Best Gaze: 0-->Normal  3. Visual: 0-->No visual loss  4. Facial Palsy: 0-->Normal symmetrical movements  5a. Motor Arm, Left: 0-->No drift, limb holds 90 (or 45) degrees for full 10 secs  5b. Motor Arm, Right: 0-->No drift, limb holds 90 (or 45) degrees for full 10 secs  6a. Motor Leg, Left: 0-->No drift, leg holds 30 degree position for full 5 secs  6b. Motor Leg, Right: 0-->No drift, leg holds 30 degree position for full 5 secs  7. Limb Ataxia: 0-->Absent  8. Sensory: 0-->Normal, no sensory loss  9. Best Language: 0-->No aphasia, normal  10. Dysarthria: 0-->Normal  11. Extinction and Inattention (formerly Neglect): 0-->No abnormality  Total (NIH Stroke Scale): 0    Modified [place] Score: 0  [place] Coma Scale:15   ABCD2 Score: 3  [id number] Score:   HAS -BLED Score:   ICH Score:   Hunt & Joyce Classification:     Assessment/Plan:     Brain and vessel Imaging   Impression:   No intracranial abnormality.     Cardiac imaging  The left ventricle is normal in size with normal systolic function. The estimated ejection fraction is 60%.  Normal right ventricular size with normal right ventricular systolic function.  Normal left ventricular diastolic function.  The estimated PA systolic pressure is 22 mmHg.  Normal central venous pressure (3 mmHg).  The bubble study is equivocal for intracardiac shunting. There is, however, evidence of substantial extracardiac shunting with copious bubbles appearing in the left heart 5+ cardiac cycles  after reaching the right heart.    Interventions: None    Complications: None    Disposition: Home or Self Care    Final Active Diagnoses:    Diagnosis Date Noted POA    PRINCIPAL PROBLEM:  Transient neurological symptoms [R29.818] 11/15/2022 Yes    Interatrial cardiac shunt [Q24.8] 11/16/2022 Not Applicable    Dysarthria and anarthria [R47.1] 11/15/2022 Yes    Tobacco use [Z72.0] 11/15/2022 Yes      Problems Resolved During this Admission:     No new Assessment & Plan notes have been filed under this hospital service since the last note was generated.  Service: Vascular Neurology    Recommendations:     Post-discharge complication risks: None    Stroke Education given to: patient and family    Follow-up in Stroke Clinic in 4-6 weeks.     Discharge Plan:  Antithrombotics: Aspirin 81mg, Clopidogrel 75mg  Statin: Atorvastatin 40mg  Smoking Cessation  Aggresive risk factor modification:  Smoking  High Cholesterol  Diet  Exercise  Quit vaping    Follow Up:   Follow-up Information       Osei Wade MD Follow up in 1 month(s).    Specialty: Internal Medicine  Contact information:  [address] [facility] [address]  [phone]                           Patient Instructions:      CT Chest Without Contrast   Standing Status: Future Standing Exp. Date: 11/16/23     Order Specific Question Answer Comments   May the Radiologist modify the order per protocol to meet the clinical needs of the patient? Yes    Access port per protocol? No      Ambulatory referral/consult to Pulmonology   Standing Status: Future   Referral Priority: Routine Referral Type: Consultation   Referral Reason: Specialty Services Required   Requested Specialty: Pulmonary Disease   Number of Visits Requested: 1     Ambulatory referral/consult to Vascular Neurology   Standing Status: Future   Referral Priority: Routine Referral Type: Consultation   Referral Reason: Specialty Services Required   Requested Specialty: Vascular Neurology   Number of  Visits Requested: 1     Ambulatory referral/consult to Vascular Neurology   Standing Status: Future   Referral Priority: Routine Referral Type: Consultation   Referral Reason: Specialty Services Required   Requested Specialty: Vascular Neurology   Number of Visits Requested: 1     Diet Cardiac   Order Comments: See Stroke Patient Education Guide Booklet for details.     Call 911 for any of the following:   Order Comments: Call 911  right away if any of the following warning signs come on suddenly, even if the symptoms only last for a few minutes. With stroke, timing is very important.   - Warning Signs of Stroke:  - Weakness: You may feel a sudden weakness, tingling or loss of feeling on one side of your face or body.  - Vision Problems: You may have sudden double vision or trouble seeing in one or both eyes.  - Speech Problems: You may have sudden trouble talking, slured speech, or problems understanding others.  - Headache: You may have sudden, severe headache.  - Movement Problems: You may experience dizziness, a feeling of spinning, a loss of balance, a feeling of falling or blackouts.     Activity as tolerated     Cardiac event monitor   Standing Status: Future Standing Exp. Date: 11/16/23     Order Specific Question Answer Comments   Cardiac Event Monitor Auto Trigger    Release to patient Immediate      Medications:  Reconciled Home Medications:      Medication List        START taking these medications      aspirin 81 MG EC tablet  Commonly known as: ECOTRIN  Take 1 tablet (81 mg total) by mouth once daily.  Start taking on: November 17, 2022     atorvastatin 40 MG tablet  Commonly known as: LIPITOR  Take 1 tablet (40 mg total) by mouth once daily.  Start taking on: November 17, 2022     clopidogreL 75 mg tablet  Commonly known as: PLAVIX  Take 1 tablet (75 mg total) by mouth once daily. for 20 days  Start taking on: November 17, 2022            Donavon Sethi MD  Comprehensive Stroke Center  Department of  Vascular Neurology   [facility] ([facility])

## 2022-11-16 NOTE — ASSESSMENT & PLAN NOTE
Stroke risk factor, quit smoking years ago and now vapes.    - educated about the need for quitting

## 2022-11-16 NOTE — NURSING
Patient identified by 2 identifiers.   Allergies reviewed.  20 g IV place to Lt AC .  Bubble study explained to patient, patient verbalized understanding.  Bubble performed X 2, with & without Valsalva.  Pt tolerated procedure well.

## 2022-11-16 NOTE — ASSESSMENT & PLAN NOTE
41 y/o male with presented with left hand tingling, dysarthria, left calf tingling/weakness, and blurry vision right visual field.  Symptoms lasted 1-2 minutes and then completely resolved.  No additional episodes since 1:40pm on the 15th.  CTA head and neck with no high-grade stenosis or occlusion.  Symptoms are consistent with a transient neurological event, likely TIA.  MRI brain negative for any acute intracranial abnormality.  TTE consistent with large PFO.       Recommendations  - ASA 81mg daily with Plavix 75mg x 21 days then monotherapy with ASA 81mg  - Atorvastatin 40mg daily for goal LDL < 70  - no need for PT/OT/SLP  - outpatient follow up with Vascular Neurology, upon discharge

## 2022-11-16 NOTE — PROGRESS NOTES
Saroj Ibrahim - Neurosurgery ([facility])  Vascular Neurology  Comprehensive Stroke Center  Progress Note    Assessment/Plan:     * Transient neurological symptoms  43 y/o male with presented with left hand tingling, dysarthria, left calf tingling/weakness, and blurry vision right visual field.  Symptoms lasted 1-2 minutes and then completely resolved.  No additional episodes since 1:40pm on the 15th.  CTA head and neck with no high-grade stenosis or occlusion.  Symptoms are consistent with a transient neurological event, likely TIA.  MRI brain negative for any acute intracranial abnormality.  TTE consistent with large PFO.       Recommendations  - ASA 81mg daily with Plavix 75mg x 21 days then monotherapy with ASA 81mg  - Atorvastatin 40mg daily for goal LDL < 70  - no need for PT/OT/SLP  - outpatient follow up with Vascular Neurology, upon discharge    Interatrial cardiac shunt  Outpatient follow up with Pulmonology for CT chest without contrast    Tobacco use  Stroke risk factor, quit smoking years ago and now vapes.    - educated about the need for quitting        Dysarthria and anarthria  -Currently resolved      11/16/2022 - no acute events overnight, patient remains at his baseline neurological functioning.  Per  the patient is at baseline and has normal speech.  Patient educated regarding his vaping and agreed that he will quit and stop and has support from his .  Patient is otherwise stable for discharge, and will go home with dual antiplatelet for 21 days and then stop plavix and continue aspirin for lifelong therapy.  Patient will follow up outpatient with Vascular Neurology as out patient.    STROKE DOCUMENTATION      NIH Scale:  1a. Level of Consciousness: 0-->Alert, keenly responsive  1b. LOC Questions: 0-->Answers both questions correctly  1c. LOC Commands: 0-->Performs both tasks correctly  2. Best Gaze: 0-->Normal  3. Visual: 0-->No visual loss  4. Facial Palsy: 0-->Normal symmetrical  movements  5a. Motor Arm, Left: 0-->No drift, limb holds 90 (or 45) degrees for full 10 secs  5b. Motor Arm, Right: 0-->No drift, limb holds 90 (or 45) degrees for full 10 secs  6a. Motor Leg, Left: 0-->No drift, leg holds 30 degree position for full 5 secs  6b. Motor Leg, Right: 0-->No drift, leg holds 30 degree position for full 5 secs  7. Limb Ataxia: 0-->Absent  8. Sensory: 0-->Normal, no sensory loss  9. Best Language: 0-->No aphasia, normal  10. Dysarthria: 0-->Normal  11. Extinction and Inattention (formerly Neglect): 0-->No abnormality  Total (NIH Stroke Scale): 0     Modified Lamar Score: 0  Mansfield Coma Scale:15   ABCD2 Score: 3  [id number] Score:   HAS -BLED Score:   ICH Score:   Hunt & Joyce Classification:      Hemorrhagic change of an Ischemic Stroke: Does this patient have an ischemic stroke with hemorrhagic changes? No     Neurologic Chief Complaint:   Left hand tingling, dysarthria, left calf tingling and weakness, right eye blurred vision    Subjective:     Interval History: Patient is seen for follow-up neurological assessment and treatment recommendations: no acute events, remains at his baseline.  Started dual antiplatelet and tolerating.    HPI, Past Medical, Family, and Social History remains the same as documented in the initial encounter.     Review of Systems   Constitutional:  Negative for fatigue and fever.   HENT:  Negative for hearing loss, tinnitus, trouble swallowing and voice change.    Eyes:  Negative for pain and visual disturbance.   Respiratory:  Negative for cough, chest tightness and shortness of breath.    Cardiovascular:  Negative for chest pain and palpitations.   Gastrointestinal:  Negative for abdominal pain, nausea and vomiting.   Endocrine: Negative.    Genitourinary: Negative.    Musculoskeletal: Negative.    Skin:  Negative for rash.   Neurological:  Negative for dizziness, facial asymmetry, speech difficulty, weakness, light-headedness, numbness and headaches.    Psychiatric/Behavioral: Negative.     Scheduled Meds:   aspirin  81 mg Oral Daily    atorvastatin  40 mg Oral Daily    clopidogreL  75 mg Oral Daily     Continuous Infusions:   sodium chloride 0.9%       PRN Meds:acetaminophen, labetaloL, sodium chloride 0.9%, sodium chloride 0.9%    Objective:     Vital Signs (Most Recent):  Temp: 97.4 °F (36.3 °C) (11/16/22 0900)  Pulse: 65 (11/16/22 0900)  Resp: 16 (11/16/22 0829)  BP: 122/80 (11/16/22 0900)  SpO2: 96 % (11/16/22 0900)  BP Location: Left arm    Vital Signs Range (Last 24H):  Temp:  [96.6 °F (35.9 °C)-99.1 °F (37.3 °C)]   Pulse:  [58-82]   Resp:  [11-20]   BP: (111-169)/(77-98)   SpO2:  [94 %-99 %]   BP Location: Left arm    Physical Exam  Constitutional:       Appearance: Normal appearance.   HENT:      Head: Normocephalic and atraumatic.      Nose: Nose normal.      Mouth/Throat:      Mouth: Mucous membranes are moist.      Pharynx: Oropharynx is clear.   Eyes:      General: No visual field deficit.     Extraocular Movements: Extraocular movements intact.      Pupils: Pupils are equal, round, and reactive to light.   Cardiovascular:      Rate and Rhythm: Normal rate and regular rhythm.      Pulses: Normal pulses.   Pulmonary:      Effort: Pulmonary effort is normal. No respiratory distress.   Abdominal:      Palpations: Abdomen is soft.   Musculoskeletal:         General: Normal range of motion.      Cervical back: Normal range of motion.   Skin:     General: Skin is warm and dry.   Neurological:      General: No focal deficit present.      Mental Status: He is alert and oriented to person, place, and time. Mental status is at baseline.      GCS: GCS eye subscore is 4. GCS verbal subscore is 5. GCS motor subscore is 6.      Cranial Nerves: No cranial nerve deficit, dysarthria or facial asymmetry.      Sensory: No sensory deficit.      Motor: No weakness, abnormal muscle tone or pronator drift.      Coordination: Coordination normal. Finger-Nose-Finger Test  and Heel to Shin Test normal.      Gait: Gait normal.      Deep Tendon Reflexes: Reflexes are normal and symmetric.      Reflex Scores:       Tricep reflexes are 2+ on the right side and 2+ on the left side.       Bicep reflexes are 2+ on the right side and 2+ on the left side.       Brachioradialis reflexes are 2+ on the right side and 2+ on the left side.       Patellar reflexes are 2+ on the right side and 2+ on the left side.       Achilles reflexes are 2+ on the right side and 2+ on the left side.  Psychiatric:         Mood and Affect: Mood normal.     Neurological Exam:   LOC: alert  Attention Span: Good   Language: No aphasia  Articulation: No dysarthria  Orientation: Person, Place, Time   Visual Fields: Full  EOM (CN III, IV, VI): Full/intact  Pupils (CN II, III): PERRL  Facial Sensation (CN V): Normal  Facial Movement (CN VII): Symmetric facial expression    Reflexes: 2+ throughout  Motor: 5/5 throughout  Cerebellum: No evidence of appendicular or axial ataxia  Sensation: Intact to light touch, temperature and vibration  Tone: Normal tone throughout    Laboratory:  CMP:   Recent Labs   Lab 11/16/22  0403   CALCIUM 9.9   ALBUMIN 4.2   PROT 7.0      K 3.8   CO2 27      BUN 13   CREATININE 1.1   ALKPHOS 62   ALT 86*   AST 35   BILITOT 0.6     CBC:   Recent Labs   Lab 11/16/22  0403   WBC 6.49   RBC 4.48*   HGB 13.8*   HCT 40.2      MCV 90   MCH 30.8   MCHC 34.3     Lipid Panel:   Recent Labs   Lab 11/15/22  1524   CHOL 166   LDLCALC 99.0   HDL 48   TRIG 95     Coagulation:   Recent Labs   Lab 11/16/22  0403   INR 1.1   APTT 24.9     Hgb A1C:   Recent Labs   Lab 11/15/22  1524   HGBA1C 5.5     TSH:   Recent Labs   Lab 11/15/22  1524   TSH 1.598     Brain and vessel Imaging   Impression:   No intracranial abnormality.    Cardiac imaging   The left ventricle is normal in size with normal systolic function. The estimated ejection fraction is 60%.   Normal right ventricular size with normal  right ventricular systolic function.   Normal left ventricular diastolic function.   The estimated PA systolic pressure is 22 mmHg.   Normal central venous pressure (3 mmHg).   The bubble study is equivocal for intracardiac shunting. There is, however, evidence of substantial extracardiac shunting with copious bubbles appearing in the left heart 5+ cardiac cycles after reaching the right heart.    Donavon Sethi MD  [facility]  Department of Vascular Neurology   [facility])

## 2022-11-16 NOTE — DISCHARGE INSTRUCTIONS
Please ensure to follow up with Vascular Neurology in 4-6 weeks, the referral has been placed, they will reach out to you to arrange.    A referral was placed for Pulmonology for TTE revealing intracardiac shunt.

## 2022-11-16 NOTE — PLAN OF CARE
Pt AAOx4. RA. Tele intact; NSR. Skin intact. 20g L AC; flushed and saline locked. Pt passed manley exam, placed on cardiac diet. Spouse at bedside. Pt ambulates in room independently. No deficits noted. Bed in lowest position, wheels locked, side rails up x2  and call light within reach. No c/o pain voiced/expressed. No s/s of distress noted. Monitoring continues.

## 2022-11-16 NOTE — ED TRIAGE NOTES
The pt is a 42-year-old male who presents to the ED from home via personal transportation. The pt states that he had slurred speech and numbness to the left side. Upon arrival to the ED, the pt states that his symptoms have since resolved

## 2022-11-16 NOTE — NURSING
Patient  ambulated off unit for home accompanied by spouse,,discharge instructions given and voiced understanding

## 2022-11-16 NOTE — HOSPITAL COURSE
11/16/2022 - no acute events overnight, patient remains at his baseline neurological functioning.  Per  the patient is at baseline and has normal speech.  Patient educated regarding his vaping and agreed that he will quit and stop and has support from his .  Patient is otherwise stable for discharge, and will go home with dual antiplatelet for 21 days and then stop plavix and continue aspirin for lifelong therapy.  Patient will follow up outpatient with Vascular Neurology as out patient.  TTE revealed an intracardiac shunt.  Outpatient CT chest without contrast has been placed and will be followed by Pulmonary.

## 2022-11-25 ENCOUNTER — CLINICAL SUPPORT (OUTPATIENT)
Dept: CARDIOLOGY | Facility: HOSPITAL | Age: 42
End: 2022-11-25
Attending: STUDENT IN AN ORGANIZED HEALTH CARE EDUCATION/TRAINING PROGRAM
Payer: COMMERCIAL

## 2022-11-25 DIAGNOSIS — G45.9 TIA (TRANSIENT ISCHEMIC ATTACK): ICD-10-CM

## 2022-11-25 PROCEDURE — 93272 ECG/REVIEW INTERPRET ONLY: CPT | Mod: ,,, | Performed by: INTERNAL MEDICINE

## 2022-11-25 PROCEDURE — 93270 REMOTE 30 DAY ECG REV/REPORT: CPT

## 2022-11-25 PROCEDURE — 93272 CARDIAC EVENT MONITOR (CUPID ONLY): ICD-10-PCS | Mod: ,,, | Performed by: INTERNAL MEDICINE

## 2022-12-14 ENCOUNTER — OFFICE VISIT (OUTPATIENT)
Dept: NEUROLOGY | Facility: CLINIC | Age: 42
End: 2022-12-14
Payer: COMMERCIAL

## 2022-12-14 VITALS
HEIGHT: 73 IN | DIASTOLIC BLOOD PRESSURE: 92 MMHG | WEIGHT: 230.81 LBS | HEART RATE: 82 BPM | SYSTOLIC BLOOD PRESSURE: 150 MMHG | BODY MASS INDEX: 30.59 KG/M2

## 2022-12-14 DIAGNOSIS — Z86.73 HISTORY OF TIA (TRANSIENT ISCHEMIC ATTACK): ICD-10-CM

## 2022-12-14 DIAGNOSIS — R29.818 TRANSIENT NEUROLOGICAL SYMPTOMS: Primary | ICD-10-CM

## 2022-12-14 PROCEDURE — 3044F HG A1C LEVEL LT 7.0%: CPT | Mod: CPTII,S$GLB,, | Performed by: STUDENT IN AN ORGANIZED HEALTH CARE EDUCATION/TRAINING PROGRAM

## 2022-12-14 PROCEDURE — 3008F BODY MASS INDEX DOCD: CPT | Mod: CPTII,S$GLB,, | Performed by: STUDENT IN AN ORGANIZED HEALTH CARE EDUCATION/TRAINING PROGRAM

## 2022-12-14 PROCEDURE — 3008F PR BODY MASS INDEX (BMI) DOCUMENTED: ICD-10-PCS | Mod: CPTII,S$GLB,, | Performed by: STUDENT IN AN ORGANIZED HEALTH CARE EDUCATION/TRAINING PROGRAM

## 2022-12-14 PROCEDURE — 1159F MED LIST DOCD IN RCRD: CPT | Mod: CPTII,S$GLB,, | Performed by: STUDENT IN AN ORGANIZED HEALTH CARE EDUCATION/TRAINING PROGRAM

## 2022-12-14 PROCEDURE — 99214 PR OFFICE/OUTPT VISIT, EST, LEVL IV, 30-39 MIN: ICD-10-PCS | Mod: S$GLB,,, | Performed by: STUDENT IN AN ORGANIZED HEALTH CARE EDUCATION/TRAINING PROGRAM

## 2022-12-14 PROCEDURE — 3077F SYST BP >= 140 MM HG: CPT | Mod: CPTII,S$GLB,, | Performed by: STUDENT IN AN ORGANIZED HEALTH CARE EDUCATION/TRAINING PROGRAM

## 2022-12-14 PROCEDURE — 3080F DIAST BP >= 90 MM HG: CPT | Mod: CPTII,S$GLB,, | Performed by: STUDENT IN AN ORGANIZED HEALTH CARE EDUCATION/TRAINING PROGRAM

## 2022-12-14 PROCEDURE — 99999 PR PBB SHADOW E&M-EST. PATIENT-LVL III: CPT | Mod: PBBFAC,,, | Performed by: STUDENT IN AN ORGANIZED HEALTH CARE EDUCATION/TRAINING PROGRAM

## 2022-12-14 PROCEDURE — 99214 OFFICE O/P EST MOD 30 MIN: CPT | Mod: S$GLB,,, | Performed by: STUDENT IN AN ORGANIZED HEALTH CARE EDUCATION/TRAINING PROGRAM

## 2022-12-14 PROCEDURE — 3077F PR MOST RECENT SYSTOLIC BLOOD PRESSURE >= 140 MM HG: ICD-10-PCS | Mod: CPTII,S$GLB,, | Performed by: STUDENT IN AN ORGANIZED HEALTH CARE EDUCATION/TRAINING PROGRAM

## 2022-12-14 PROCEDURE — 99999 PR PBB SHADOW E&M-EST. PATIENT-LVL III: ICD-10-PCS | Mod: PBBFAC,,, | Performed by: STUDENT IN AN ORGANIZED HEALTH CARE EDUCATION/TRAINING PROGRAM

## 2022-12-14 PROCEDURE — 3080F PR MOST RECENT DIASTOLIC BLOOD PRESSURE >= 90 MM HG: ICD-10-PCS | Mod: CPTII,S$GLB,, | Performed by: STUDENT IN AN ORGANIZED HEALTH CARE EDUCATION/TRAINING PROGRAM

## 2022-12-14 PROCEDURE — 1159F PR MEDICATION LIST DOCUMENTED IN MEDICAL RECORD: ICD-10-PCS | Mod: CPTII,S$GLB,, | Performed by: STUDENT IN AN ORGANIZED HEALTH CARE EDUCATION/TRAINING PROGRAM

## 2022-12-14 PROCEDURE — 3044F PR MOST RECENT HEMOGLOBIN A1C LEVEL <7.0%: ICD-10-PCS | Mod: CPTII,S$GLB,, | Performed by: STUDENT IN AN ORGANIZED HEALTH CARE EDUCATION/TRAINING PROGRAM

## 2022-12-27 NOTE — PROGRESS NOTES
Vascular Neurology Clinic  Hospital Follow-up Clinic Visit    Patient Name: Sukhwinder Lyon  MRN: [id number]  Date: 12/14/22  Referring Provider: Dr. Jayashree Rosa    CC: TIA    SUBJECTIVE:      History of Present Illness: Sukhwinder Lyon is a 42 y.o. male with a past medical history significant for prior tobacco use (current vape use), h/o COVID infection who presents for follow-up for a recent admission for TIA.     He was admitted to the Vascular Neurology service 11/15-11/16/2022 after presenting with left hand tingling, slurred speech, right visual field blurry vision, and left calf weakness. Symptoms lasted 1-2 minutes and resolved. MRI brain was negative for acute infarction. CTA head/neck did not any high-risk lesions. TTE was equivocal for intracardiac shunting. Plan for CT chest with pulmonology. Since discharge, he denies new or worsening focal neurologic deficits. He continues to take ASA 81mg and atorvastatin 40mg daily. Working on reducing vape use. Cardiac event monitor in place - results pending.      Etiology: TIA  Intervention: None    Work-up:    Imaging:  - NCCTH (11/15/2022): No evidence of acute hemorrhage or major vascular distribution infarct.  - CTA head and neck (11/15/2022): Unremarkable CT arteriogram.  No evidence of high-grade stenosis or large vessel occlusion. Mild cervical spondylosis.  - MRI brain without contrast (11/15/2022): No intracranial abnormality.    Cardiac Evaluation:  - TTE (11/16/2022):  The left ventricle is normal in size with normal systolic function. The estimated ejection fraction is 60%.  Normal right ventricular size with normal right ventricular systolic function.  Normal left ventricular diastolic function.  The estimated PA systolic pressure is 22 mmHg.  Normal central venous pressure (3 mmHg).  The bubble study is equivocal for intracardiac shunting. There is, however, evidence of substantial extracardiac shunting with copious bubbles appearing in the left  heart 5+ cardiac cycles after reaching the right heart.    - Cardiac monitor: results pending    Labs:  Recent Labs   Lab 11/15/22  1524   Hemoglobin A1C 5.5   LDL Cholesterol 99.0   HDL 48   Triglycerides 95   Cholesterol 166         Review of Systems: The following systems were reviewed with pertinent positives and negatives documented in the HPI: constitutional, eyes, CV, respiratory, GI, , musculoskeletal, skin, neurological, psychiatric    Past Medical History:  No past medical history on file.    Medications:    Current Outpatient Medications:     aspirin (ECOTRIN) 81 MG EC tablet, Take 1 tablet (81 mg total) by mouth once daily., Disp: 90 tablet, Rfl: 3    atorvastatin (LIPITOR) 40 MG tablet, Take 1 tablet (40 mg total) by mouth once daily., Disp: 90 tablet, Rfl: 3    clopidogreL (PLAVIX) 75 mg tablet, Take 1 tablet (75 mg total) by mouth once daily. for 20 days, Disp: 20 tablet, Rfl: 0  Any other notable medications as documented in HPI.    Allergies:  Review of patient's allergies indicates:  No Known Allergies    Social History:  Social History     Socioeconomic History    Marital status:    Tobacco Use    Smoking status: Former     Packs/day: 0.50     Years: 20.00     Pack years: 10.00     Types: Vaping with nicotine, Cigarettes     Quit date: 2020     Years since quittin.9    Smokeless tobacco: Never    Tobacco comments:     Former smoker- 10 pack years, switched to vaping-half pod per day; quit vaping   Substance and Sexual Activity    Alcohol use: Not Currently     Alcohol/week: 0.0 standard drinks     Comment: Only at special occasions, ie. Weddings    Drug use: Not Currently     Types: Marijuana     Comment: Occasional marijuana use, no problematic usage    Sexual activity: Yes     Partners: Male     Birth control/protection: Condom     Comment: One sexual partner, gifty     Social Determinants of Health     Financial Resource Strain: Low Risk     Difficulty of Paying Living  Expenses: Not hard at all   Food Insecurity: No Food Insecurity    Worried About Running Out of Food in the Last Year: Never true    Ran Out of Food in the Last Year: Never true   Transportation Needs: No Transportation Needs    Lack of Transportation (Medical): No    Lack of Transportation (Non-Medical): No   Physical Activity: Sufficiently Active    Days of Exercise per Week: 3 days    Minutes of Exercise per Session: 50 min   Stress: No Stress Concern Present    Feeling of Stress : Not at all   Social Connections: Unknown    Frequency of Communication with Friends and Family: More than three times a week    Frequency of Social Gatherings with Friends and Family: Twice a week    Active Member of Clubs or Organizations: Yes    Attends Club or Organization Meetings: 1 to 4 times per year    Marital Status:    Housing Stability: Low Risk     Unable to Pay for Housing in the Last Year: No    Number of Places Lived in the Last Year: 1    Unstable Housing in the Last Year: No     Any other notable Social History as documented in HPI.    Family History:  Family History   Problem Relation Age of Onset    Alcohol abuse Paternal Uncle     Cancer Paternal Uncle         Lung Cancer/    Early death Paternal Uncle          at 50 years old, cancer    Alcohol abuse Maternal Uncle     COPD Maternal Uncle     Alcohol abuse Maternal Uncle     Early death Maternal Uncle          at , alcohol related liver disease    Alcohol abuse Maternal Grandfather     Early death Maternal Grandfather          in 50's, myocardial infarction    Heart disease Maternal Grandfather     Arthritis Maternal Grandmother     COPD Maternal Grandmother     Depression Maternal Grandmother         Onset after death of daughter    Hearing loss Maternal Grandmother     Heart disease Maternal Grandmother     Arthritis Paternal Grandmother     Diabetes Paternal Grandmother     Cancer Father         Lung Cancer/     Depression Father         Onset after death of wife    Early death Father          at 59 years old, cancer    Hypertension Father     Cancer Paternal Grandfather         Lung Cancer/    Cancer Paternal Aunt         Lung Cancer/    Cancer Paternal Uncle         Colon Cancer/    COPD Paternal Uncle     Early death Mother          at 47 years old, pneumonia and sepsis     Any other notable FMH as documented in HPI.      OBJECTIVE:     Physical Exam:   Vitals:    22 1040   BP: (!) 150/92   Pulse: 82       GEN: NAD, pleasant, cooperative  CV: RRR  PULM: No signs of resp distress, on room air  EXT: No cyanosis, clubbing, or edema.    NEURO:  Mental status: The patient is alert, attentive, and oriented to self, age, month, year.  Speech: Fluent speech with intact repetition, comprehension, and naming. Phonation is normal.    Cranial nerves:  CN II: Visual fields are full to confrontation. Pupils are 3mm and reactive to light OU.  CN III, IV, VI: EOMI, no nystagmus, no ptosis  CN V: Facial sensation is intact in all 3 divisions bilaterally.  CN VII: Face is symmetric with normal eye closure and smile.  CN VIII: Hearing is normal bilaterally.  CN IX, X: Palate elevates symmetrically.   CN XI: Head turning and shoulder shrug are intact.  CN XII: Tongue is midline with normal movements and no atrophy.    Motor: Muscle bulk and tone are normal. 5/5 strength throughout.     Reflexes: Reflexes are 2+ and symmetric at the biceps, triceps, knees.    Sensory: Light touch intact in bilateral upper and lower extremities.    Coordination: Rapid alternating movements and fine finger movements are intact. There is no dysmetria on finger-to-nose and heel-knee-shin. There are no abnormal or extraneous movements.    Gait/Stance: Posture is normal. Gait is steady with normal steps, base, arm swing, and turning.     ASSESSMENT/PLAN:     Diagnosis/Etiology: TIA  Stroke Risk Factors: H/o tobacco use,  ?extracardiac shunt  Effects of Stroke: None    Recommendations:   - Additional studies: Cardiac monitor (results pending). CT chest pending per pulmonology given concern for extra-cardiac shunt.   - Antiplatelet/Anticoagulation: Continue ASA 81mg daily.   - Lipid Management: Target is LDL < 70mg/dL. Continue atorvastatin 40mg daily.   - Diabetes: Target hemoglobin A1c <7%, measured 2X/year or quarterly if not meeting goals  - Hypertension: Target systolic BP <130mmHg.   - Smoking: Goal is smoking cessation and encouragement not to start smoking in the future. There is also concern for secondary smoke exposure from family members and friends which is also a risk and family and friends should be encouraged to stop smoking.  - Therapy: No therapy needs at this time.   - Follow-up: RTC as needed. Recommend close follow-up with their PCP for monitoring and management of the above vascular risk factors as needed to meet goals.       Problem List Items Addressed This Visit          Neuro    Transient neurological symptoms - Primary     Other Visit Diagnoses       History of TIA (transient ischemic attack)                Raquel Wade MD, PhD  [facility]  Department of Vascular Neurology

## 2023-01-06 ENCOUNTER — OFFICE VISIT (OUTPATIENT)
Dept: PULMONOLOGY | Facility: CLINIC | Age: 43
End: 2023-01-06
Payer: COMMERCIAL

## 2023-01-06 VITALS
HEIGHT: 73 IN | OXYGEN SATURATION: 97 % | SYSTOLIC BLOOD PRESSURE: 145 MMHG | BODY MASS INDEX: 30.94 KG/M2 | HEART RATE: 83 BPM | WEIGHT: 233.44 LBS | DIASTOLIC BLOOD PRESSURE: 90 MMHG

## 2023-01-06 DIAGNOSIS — Q24.8 INTERATRIAL CARDIAC SHUNT: Primary | ICD-10-CM

## 2023-01-06 PROCEDURE — 3077F SYST BP >= 140 MM HG: CPT | Mod: CPTII,S$GLB,, | Performed by: STUDENT IN AN ORGANIZED HEALTH CARE EDUCATION/TRAINING PROGRAM

## 2023-01-06 PROCEDURE — 3077F PR MOST RECENT SYSTOLIC BLOOD PRESSURE >= 140 MM HG: ICD-10-PCS | Mod: CPTII,S$GLB,, | Performed by: STUDENT IN AN ORGANIZED HEALTH CARE EDUCATION/TRAINING PROGRAM

## 2023-01-06 PROCEDURE — 1159F PR MEDICATION LIST DOCUMENTED IN MEDICAL RECORD: ICD-10-PCS | Mod: CPTII,S$GLB,, | Performed by: STUDENT IN AN ORGANIZED HEALTH CARE EDUCATION/TRAINING PROGRAM

## 2023-01-06 PROCEDURE — 3080F DIAST BP >= 90 MM HG: CPT | Mod: CPTII,S$GLB,, | Performed by: STUDENT IN AN ORGANIZED HEALTH CARE EDUCATION/TRAINING PROGRAM

## 2023-01-06 PROCEDURE — 3008F BODY MASS INDEX DOCD: CPT | Mod: CPTII,S$GLB,, | Performed by: STUDENT IN AN ORGANIZED HEALTH CARE EDUCATION/TRAINING PROGRAM

## 2023-01-06 PROCEDURE — 1159F MED LIST DOCD IN RCRD: CPT | Mod: CPTII,S$GLB,, | Performed by: STUDENT IN AN ORGANIZED HEALTH CARE EDUCATION/TRAINING PROGRAM

## 2023-01-06 PROCEDURE — 99999 PR PBB SHADOW E&M-EST. PATIENT-LVL III: CPT | Mod: PBBFAC,,, | Performed by: STUDENT IN AN ORGANIZED HEALTH CARE EDUCATION/TRAINING PROGRAM

## 2023-01-06 PROCEDURE — 99999 PR PBB SHADOW E&M-EST. PATIENT-LVL III: ICD-10-PCS | Mod: PBBFAC,,, | Performed by: STUDENT IN AN ORGANIZED HEALTH CARE EDUCATION/TRAINING PROGRAM

## 2023-01-06 PROCEDURE — 3008F PR BODY MASS INDEX (BMI) DOCUMENTED: ICD-10-PCS | Mod: CPTII,S$GLB,, | Performed by: STUDENT IN AN ORGANIZED HEALTH CARE EDUCATION/TRAINING PROGRAM

## 2023-01-06 PROCEDURE — 3080F PR MOST RECENT DIASTOLIC BLOOD PRESSURE >= 90 MM HG: ICD-10-PCS | Mod: CPTII,S$GLB,, | Performed by: STUDENT IN AN ORGANIZED HEALTH CARE EDUCATION/TRAINING PROGRAM

## 2023-01-06 PROCEDURE — 99203 PR OFFICE/OUTPT VISIT, NEW, LEVL III, 30-44 MIN: ICD-10-PCS | Mod: S$GLB,,, | Performed by: STUDENT IN AN ORGANIZED HEALTH CARE EDUCATION/TRAINING PROGRAM

## 2023-01-06 PROCEDURE — 99203 OFFICE O/P NEW LOW 30 MIN: CPT | Mod: S$GLB,,, | Performed by: STUDENT IN AN ORGANIZED HEALTH CARE EDUCATION/TRAINING PROGRAM

## 2023-01-06 NOTE — PROGRESS NOTES
"Subjective:     Reason for visit: suspected    Patient ID:  Sukhwinder Lyon is a 42 y.o. male    Interval History:  Pt is a 42 year old male with PMH of fatty liver that presented to clinic today after an episode of neurologic symptoms on 11/15/2022 that was thought to be a TIA from a small blood clot that went through an opening in the circulatory system that caused these neurologic symptoms. Pt underwent neurologic workup was placed on aspirin, statin, and plavix at the start of this episode. Pt has finished his plavix regimen. Pt underwent an ECHO that was felt to have been equivocal for intracardiac shunting and there was worry for extracardiac shunting maybe from the lungs so patient was referred to pulmonology for further workup. Pt feels well today was not complaining of any symptoms. The neurologic symptoms have improved. He is not experiencing any SOB. He has decreased his vaping intake, but continues to vape daily.      Additional Pulmonary History:  Childhood Illnesses:  None  Occupational Exposures:  None, psychologist   Environmental Exposures:  None  Tobacco/Smoking History:  Pt used to vape 2 pods per day, but since hospitalization has decreased it down to 1 pod every 2 days.    Review of Systems   Constitutional:  Negative for chills, fever and malaise/fatigue.   HENT:  Negative for congestion and sinus pain.    Eyes:  Negative for blurred vision and photophobia.   Respiratory:  Negative for cough, sputum production, shortness of breath and wheezing.    Cardiovascular:  Negative for chest pain and leg swelling.   Gastrointestinal:  Negative for abdominal pain, nausea and vomiting.   Musculoskeletal:  Negative for joint pain and myalgias.   Endo/Heme/Allergies:  Negative for environmental allergies.      Objective:     Vitals:    01/06/23 0911   BP: (!) 145/90   BP Location: Left arm   Patient Position: Sitting   Pulse: 83   SpO2: 97%   Weight: 105.9 kg (233 lb 7.5 oz)   Height: 6' 1" (1.854 m)    "      Physical Exam  Vitals and nursing note reviewed.   Constitutional:       General: He is not in acute distress.     Appearance: Normal appearance. He is not ill-appearing, toxic-appearing or diaphoretic.   HENT:      Head: Normocephalic and atraumatic.      Nose: No congestion or rhinorrhea.      Mouth/Throat:      Mouth: Mucous membranes are moist.      Pharynx: No oropharyngeal exudate or posterior oropharyngeal erythema.   Eyes:      General: Lids are normal. No scleral icterus.     Extraocular Movements: Extraocular movements intact.   Cardiovascular:      Rate and Rhythm: Normal rate and regular rhythm.      Heart sounds: No murmur heard.  Pulmonary:      Effort: Pulmonary effort is normal. No tachypnea, accessory muscle usage, respiratory distress or retractions.      Breath sounds: Normal breath sounds. No decreased breath sounds, wheezing, rhonchi or rales.   Abdominal:      General: Abdomen is flat. There is no distension.      Palpations: Abdomen is soft.      Tenderness: There is no abdominal tenderness. There is no guarding.   Musculoskeletal:      Right lower leg: No edema.      Left lower leg: No edema.   Skin:     General: Skin is warm and dry.      Coloration: Skin is not jaundiced.      Findings: No rash.   Neurological:      General: No focal deficit present.      Mental Status: He is alert. Mental status is at baseline.   Psychiatric:         Mood and Affect: Mood normal.         Behavior: Behavior normal.        Personal Diagnostic Review and Interpretation  Felt ECHO with bubble study looked more like 4-4.5 cardiac cycles before bubbles appeared. No CT scan had been done prior to pt's appointment.      Pertinent Studies Reviewed and Interpreted:     Pulmonary Function Tests:   None    6 Minute Walk Tests:   None    Echocardiograms:   ECHO with bubble study 11/15/2022: equivocal for intracardiac shunting, worry for extracardiac shunting read as 5 cardiac cycles before bubbles appearing.      Assessment:     1. Interatrial cardiac shunt  US Liver with Doppler (xpd)          Current Outpatient Medications   Medication Instructions    aspirin (ECOTRIN) 81 mg, Oral, Daily    atorvastatin (LIPITOR) 40 mg, Oral, Daily    clopidogreL (PLAVIX) 75 mg, Oral, Daily      Sukhwinder Lyon had no medications administered during this visit.     Orders Placed This Encounter   Procedures    US Liver with Doppler (xpd)     Standing Status:   Future     Standing Expiration Date:   1/6/2024     Order Specific Question:   May the Radiologist modify the order per protocol to meet the clinical needs of the patient?     Answer:   Yes     Order Specific Question:   Release to patient     Answer:   Immediate      Plan:       Problem List Items Addressed This Visit          Cardiac/Vascular    Interatrial cardiac shunt - Primary    Overview     Left to right cardiac shunt as evident on TTE  - outpatient CT chest without contrast  - follow up and referral for Pulmonology to follow up the results         Relevant Orders    US Liver with Doppler (xpd)      Discussed with  patient the information seen in his ECHO and our interpretation of what was seen as well as other causes that could be contributing to his condition. Pt has a known history of fatty liver disease that is controlled, but could be releasing NO that could be causing some pulmonary vasodilation. Pt is not complaining of any SOB, so this seems unlikely, but will have pt undergo an US of his liver with doppler to help rule this out. Pt most likely had a minor TIA and at his young age with no risk factors is concerning. Pt has undergone an echo that was equivocal with its finding. Pt's shunt that let this blood clot pass through the venous system into the arterial system causing the TIA could have occurred through the lung, although unlikely, it is necessary to complete this workup and have pt undergo a CT scan of the chest to look for any AVMs or areas where this clot  could have passed through in the pulmonary system.      30 minutes of total time spent on the encounter, which includes face-to-face time and non-face-to-face time preparing to see the patient (eg, review of tests), obtaining and/or reviewing separately obtained history, documenting clinical information in the electronic or other health record, independently interpreting results (not separately reported), and communicating results to the patient/family/caregiver, or care coordination (not separately reported).     Portions of the record may have been created with voice-recognition software. Occasional wrong-word or sound-a-like substitutions may have occurred due to the inherent limitations of voice-recognition software. Read the chart carefully and recognize, using context, where substitutions have occurred.

## 2023-01-11 ENCOUNTER — OFFICE VISIT (OUTPATIENT)
Dept: INTERNAL MEDICINE | Facility: CLINIC | Age: 43
End: 2023-01-11
Payer: COMMERCIAL

## 2023-01-11 ENCOUNTER — LAB VISIT (OUTPATIENT)
Dept: LAB | Facility: HOSPITAL | Age: 43
End: 2023-01-11
Attending: INTERNAL MEDICINE
Payer: COMMERCIAL

## 2023-01-11 VITALS
DIASTOLIC BLOOD PRESSURE: 80 MMHG | SYSTOLIC BLOOD PRESSURE: 120 MMHG | TEMPERATURE: 98 F | WEIGHT: 234.13 LBS | HEART RATE: 76 BPM | OXYGEN SATURATION: 99 % | RESPIRATION RATE: 14 BRPM | HEIGHT: 73 IN | BODY MASS INDEX: 31.03 KG/M2

## 2023-01-11 DIAGNOSIS — R29.818 TRANSIENT NEUROLOGICAL SYMPTOMS: ICD-10-CM

## 2023-01-11 DIAGNOSIS — Z00.00 ANNUAL PHYSICAL EXAM: Primary | ICD-10-CM

## 2023-01-11 DIAGNOSIS — Z12.5 PROSTATE CANCER SCREENING: ICD-10-CM

## 2023-01-11 DIAGNOSIS — Z00.00 ANNUAL PHYSICAL EXAM: ICD-10-CM

## 2023-01-11 LAB
ALBUMIN SERPL BCP-MCNC: 4.4 G/DL (ref 3.5–5.2)
ALP SERPL-CCNC: 71 U/L (ref 55–135)
ALT SERPL W/O P-5'-P-CCNC: 98 U/L (ref 10–44)
ANION GAP SERPL CALC-SCNC: 9 MMOL/L (ref 8–16)
AST SERPL-CCNC: 42 U/L (ref 10–40)
BASOPHILS # BLD AUTO: 0.02 K/UL (ref 0–0.2)
BASOPHILS NFR BLD: 0.4 % (ref 0–1.9)
BILIRUB SERPL-MCNC: 1 MG/DL (ref 0.1–1)
BUN SERPL-MCNC: 12 MG/DL (ref 6–20)
CALCIUM SERPL-MCNC: 9.6 MG/DL (ref 8.7–10.5)
CHLORIDE SERPL-SCNC: 110 MMOL/L (ref 95–110)
CHOLEST SERPL-MCNC: 107 MG/DL (ref 120–199)
CHOLEST/HDLC SERPL: 2.4 {RATIO} (ref 2–5)
CO2 SERPL-SCNC: 21 MMOL/L (ref 23–29)
COMPLEXED PSA SERPL-MCNC: 0.2 NG/ML (ref 0–4)
CREAT SERPL-MCNC: 1 MG/DL (ref 0.5–1.4)
DIFFERENTIAL METHOD: NORMAL
EOSINOPHIL # BLD AUTO: 0 K/UL (ref 0–0.5)
EOSINOPHIL NFR BLD: 0 % (ref 0–8)
ERYTHROCYTE [DISTWIDTH] IN BLOOD BY AUTOMATED COUNT: 12.4 % (ref 11.5–14.5)
EST. GFR  (NO RACE VARIABLE): >60 ML/MIN/1.73 M^2
ESTIMATED AVG GLUCOSE: 105 MG/DL (ref 68–131)
GLUCOSE SERPL-MCNC: 118 MG/DL (ref 70–110)
HBA1C MFR BLD: 5.3 % (ref 4–5.6)
HCT VFR BLD AUTO: 41.4 % (ref 40–54)
HDLC SERPL-MCNC: 44 MG/DL (ref 40–75)
HDLC SERPL: 41.1 % (ref 20–50)
HGB BLD-MCNC: 14.2 G/DL (ref 14–18)
IMM GRANULOCYTES # BLD AUTO: 0.01 K/UL (ref 0–0.04)
IMM GRANULOCYTES NFR BLD AUTO: 0.2 % (ref 0–0.5)
LDLC SERPL CALC-MCNC: 49.8 MG/DL (ref 63–159)
LYMPHOCYTES # BLD AUTO: 1.7 K/UL (ref 1–4.8)
LYMPHOCYTES NFR BLD: 33.7 % (ref 18–48)
MCH RBC QN AUTO: 30.7 PG (ref 27–31)
MCHC RBC AUTO-ENTMCNC: 34.3 G/DL (ref 32–36)
MCV RBC AUTO: 89 FL (ref 82–98)
MONOCYTES # BLD AUTO: 0.4 K/UL (ref 0.3–1)
MONOCYTES NFR BLD: 8.1 % (ref 4–15)
NEUTROPHILS # BLD AUTO: 3 K/UL (ref 1.8–7.7)
NEUTROPHILS NFR BLD: 57.6 % (ref 38–73)
NONHDLC SERPL-MCNC: 63 MG/DL
NRBC BLD-RTO: 0 /100 WBC
PLATELET # BLD AUTO: 270 K/UL (ref 150–450)
PMV BLD AUTO: 10.3 FL (ref 9.2–12.9)
POTASSIUM SERPL-SCNC: 3.8 MMOL/L (ref 3.5–5.1)
PROT SERPL-MCNC: 7.3 G/DL (ref 6–8.4)
RBC # BLD AUTO: 4.63 M/UL (ref 4.6–6.2)
SODIUM SERPL-SCNC: 140 MMOL/L (ref 136–145)
TRIGL SERPL-MCNC: 66 MG/DL (ref 30–150)
TSH SERPL DL<=0.005 MIU/L-ACNC: 1.22 UIU/ML (ref 0.4–4)
WBC # BLD AUTO: 5.16 K/UL (ref 3.9–12.7)

## 2023-01-11 PROCEDURE — 80061 LIPID PANEL: CPT | Performed by: INTERNAL MEDICINE

## 2023-01-11 PROCEDURE — 90471 FLU VACCINE (QUAD) GREATER THAN OR EQUAL TO 3YO PRESERVATIVE FREE IM: ICD-10-PCS | Mod: S$GLB,,, | Performed by: INTERNAL MEDICINE

## 2023-01-11 PROCEDURE — 84443 ASSAY THYROID STIM HORMONE: CPT | Performed by: INTERNAL MEDICINE

## 2023-01-11 PROCEDURE — 99396 PR PREVENTIVE VISIT,EST,40-64: ICD-10-PCS | Mod: 25,S$GLB,, | Performed by: INTERNAL MEDICINE

## 2023-01-11 PROCEDURE — 36415 COLL VENOUS BLD VENIPUNCTURE: CPT | Mod: PO | Performed by: INTERNAL MEDICINE

## 2023-01-11 PROCEDURE — 85025 COMPLETE CBC W/AUTO DIFF WBC: CPT | Performed by: INTERNAL MEDICINE

## 2023-01-11 PROCEDURE — 99999 PR PBB SHADOW E&M-EST. PATIENT-LVL III: ICD-10-PCS | Mod: PBBFAC,,, | Performed by: INTERNAL MEDICINE

## 2023-01-11 PROCEDURE — 3008F BODY MASS INDEX DOCD: CPT | Mod: CPTII,S$GLB,, | Performed by: INTERNAL MEDICINE

## 2023-01-11 PROCEDURE — 3008F PR BODY MASS INDEX (BMI) DOCUMENTED: ICD-10-PCS | Mod: CPTII,S$GLB,, | Performed by: INTERNAL MEDICINE

## 2023-01-11 PROCEDURE — 99396 PREV VISIT EST AGE 40-64: CPT | Mod: 25,S$GLB,, | Performed by: INTERNAL MEDICINE

## 2023-01-11 PROCEDURE — 99999 PR PBB SHADOW E&M-EST. PATIENT-LVL III: CPT | Mod: PBBFAC,,, | Performed by: INTERNAL MEDICINE

## 2023-01-11 PROCEDURE — 1160F PR REVIEW ALL MEDS BY PRESCRIBER/CLIN PHARMACIST DOCUMENTED: ICD-10-PCS | Mod: CPTII,S$GLB,, | Performed by: INTERNAL MEDICINE

## 2023-01-11 PROCEDURE — 84153 ASSAY OF PSA TOTAL: CPT | Performed by: INTERNAL MEDICINE

## 2023-01-11 PROCEDURE — 1160F RVW MEDS BY RX/DR IN RCRD: CPT | Mod: CPTII,S$GLB,, | Performed by: INTERNAL MEDICINE

## 2023-01-11 PROCEDURE — 1159F MED LIST DOCD IN RCRD: CPT | Mod: CPTII,S$GLB,, | Performed by: INTERNAL MEDICINE

## 2023-01-11 PROCEDURE — 1159F PR MEDICATION LIST DOCUMENTED IN MEDICAL RECORD: ICD-10-PCS | Mod: CPTII,S$GLB,, | Performed by: INTERNAL MEDICINE

## 2023-01-11 PROCEDURE — 3079F PR MOST RECENT DIASTOLIC BLOOD PRESSURE 80-89 MM HG: ICD-10-PCS | Mod: CPTII,S$GLB,, | Performed by: INTERNAL MEDICINE

## 2023-01-11 PROCEDURE — 3074F SYST BP LT 130 MM HG: CPT | Mod: CPTII,S$GLB,, | Performed by: INTERNAL MEDICINE

## 2023-01-11 PROCEDURE — 83036 HEMOGLOBIN GLYCOSYLATED A1C: CPT | Performed by: INTERNAL MEDICINE

## 2023-01-11 PROCEDURE — 90686 FLU VACCINE (QUAD) GREATER THAN OR EQUAL TO 3YO PRESERVATIVE FREE IM: ICD-10-PCS | Mod: S$GLB,,, | Performed by: INTERNAL MEDICINE

## 2023-01-11 PROCEDURE — 80053 COMPREHEN METABOLIC PANEL: CPT | Performed by: INTERNAL MEDICINE

## 2023-01-11 PROCEDURE — 90471 IMMUNIZATION ADMIN: CPT | Mod: S$GLB,,, | Performed by: INTERNAL MEDICINE

## 2023-01-11 PROCEDURE — 3079F DIAST BP 80-89 MM HG: CPT | Mod: CPTII,S$GLB,, | Performed by: INTERNAL MEDICINE

## 2023-01-11 PROCEDURE — 3074F PR MOST RECENT SYSTOLIC BLOOD PRESSURE < 130 MM HG: ICD-10-PCS | Mod: CPTII,S$GLB,, | Performed by: INTERNAL MEDICINE

## 2023-01-11 PROCEDURE — 90686 IIV4 VACC NO PRSV 0.5 ML IM: CPT | Mod: S$GLB,,, | Performed by: INTERNAL MEDICINE

## 2023-01-11 NOTE — PROGRESS NOTES
Subjective:       Patient ID: Sukhwinder Lyon is a 42 y.o. male.    Chief Complaint: Annual Exam    HPI    42 y.o. male here for annual exam.     Cholesterol: needs  Vaccines: Influenza - needs; Tetanus - 2021; COVID - 3 done  Sexual Screening: active  STD screening: no concern  Eye exam: done last October  Prostate: needs  Colonoscopy: distant cousin with cancer.  A1c: needs    Exercise: three times a week - 50- minutes.  Mix of cardio and cardio.  Diet: mostly Mediterranean diet.    History reviewed. No pertinent past medical history.  History reviewed. No pertinent surgical history.  Social History     Socioeconomic History    Marital status:    Tobacco Use    Smoking status: Former     Types: Vaping with nicotine    Smokeless tobacco: Never    Tobacco comments:     Former smoker- 10 pack years, switched to vaping-half pod per day; working to quit currently.   Substance and Sexual Activity    Alcohol use: Yes     Alcohol/week: 3.0 standard drinks     Types: 3 Glasses of wine per week     Comment: Instructed by vascular neurology to have 1-2 drinks daily.    Drug use: Not Currently     Types: Marijuana     Comment: Occasional use of cannabis    Sexual activity: Yes     Partners: Male     Birth control/protection: Condom     Comment: One sexual partner, spouse.     Social Determinants of Health     Financial Resource Strain: Low Risk     Difficulty of Paying Living Expenses: Not hard at all   Food Insecurity: No Food Insecurity    Worried About Running Out of Food in the Last Year: Never true    Ran Out of Food in the Last Year: Never true   Transportation Needs: No Transportation Needs    Lack of Transportation (Medical): No    Lack of Transportation (Non-Medical): No   Physical Activity: Sufficiently Active    Days of Exercise per Week: 3 days    Minutes of Exercise per Session: 50 min   Stress: No Stress Concern Present    Feeling of Stress : Not at all   Social Connections: Unknown    Frequency of  Communication with Friends and Family: More than three times a week    Frequency of Social Gatherings with Friends and Family: Twice a week    Active Member of Clubs or Organizations: Yes    Attends Club or Organization Meetings: 1 to 4 times per year    Marital Status:    Housing Stability: Low Risk     Unable to Pay for Housing in the Last Year: No    Number of Places Lived in the Last Year: 1    Unstable Housing in the Last Year: No     Review of patient's allergies indicates:  No Known Allergies  Sukhwinder Lyon had no medications administered during this visit.    Review of Systems   Constitutional:  Negative for activity change and unexpected weight change.   HENT:  Negative for hearing loss, rhinorrhea and trouble swallowing.    Eyes:  Negative for discharge and visual disturbance.   Respiratory:  Negative for chest tightness and wheezing.    Cardiovascular:  Negative for chest pain and palpitations.   Gastrointestinal:  Negative for blood in stool, constipation, diarrhea and vomiting.   Endocrine: Negative for polydipsia and polyuria.   Genitourinary:  Negative for difficulty urinating, hematuria and urgency.   Musculoskeletal:  Negative for arthralgias, joint swelling and neck pain.   Neurological:  Negative for weakness and headaches.   Psychiatric/Behavioral:  Negative for confusion and dysphoric mood.        Objective:      Physical Exam  Vitals reviewed.   Constitutional:       Appearance: He is well-developed.   HENT:      Head: Normocephalic and atraumatic.      Mouth/Throat:      Pharynx: No oropharyngeal exudate.   Eyes:      General: No scleral icterus.        Right eye: No discharge.         Left eye: No discharge.      Pupils: Pupils are equal, round, and reactive to light.   Neck:      Thyroid: No thyromegaly.      Trachea: No tracheal deviation.   Cardiovascular:      Rate and Rhythm: Normal rate and regular rhythm.      Heart sounds: Normal heart sounds. No murmur heard.    No friction  rub. No gallop.   Pulmonary:      Effort: Pulmonary effort is normal. No respiratory distress.      Breath sounds: Normal breath sounds. No wheezing or rales.   Chest:      Chest wall: No tenderness.   Abdominal:      General: Bowel sounds are normal. There is no distension.      Palpations: Abdomen is soft. There is no mass.      Tenderness: There is no abdominal tenderness. There is no guarding or rebound.   Musculoskeletal:         General: No tenderness. Normal range of motion.      Cervical back: Normal range of motion and neck supple.   Skin:     General: Skin is warm and dry.      Coloration: Skin is not pale.      Findings: No erythema or rash.   Neurological:      Mental Status: He is alert and oriented to person, place, and time.   Psychiatric:         Behavior: Behavior normal.       Assessment:       1. Annual physical exam  - CBC Auto Differential; Future  - Comprehensive Metabolic Panel; Future  - TSH; Future  - Lipid Panel; Future  - Hemoglobin A1C; Future    2. Prostate cancer screening  - PSA, Screening; Future    3. Transient neurological symptoms      Plan:       1. Check CBC, CMP, TSH, lipids, A1c.  Discussed diet and exercise.  Flu vaccine given today.  Recommend COVID vaccine.  2.  Check PSA.    3. Continue aspirin 81 mg, Lipitor 40 mg.

## 2023-01-13 ENCOUNTER — HOSPITAL ENCOUNTER (OUTPATIENT)
Dept: RADIOLOGY | Facility: HOSPITAL | Age: 43
Discharge: HOME | End: 2023-01-13
Attending: STUDENT IN AN ORGANIZED HEALTH CARE EDUCATION/TRAINING PROGRAM
Payer: COMMERCIAL

## 2023-01-13 ENCOUNTER — PATIENT MESSAGE (OUTPATIENT)
Dept: PULMONOLOGY | Facility: CLINIC | Age: 43
End: 2023-01-13
Payer: COMMERCIAL

## 2023-01-13 DIAGNOSIS — Q24.8 INTERATRIAL CARDIAC SHUNT: ICD-10-CM

## 2023-01-13 PROCEDURE — 71250 CT THORAX DX C-: CPT | Mod: 26,,, | Performed by: RADIOLOGY

## 2023-01-13 PROCEDURE — 71250 CT THORAX DX C-: CPT | Mod: TC

## 2023-01-13 PROCEDURE — 71250 CT CHEST WITHOUT CONTRAST: ICD-10-PCS | Mod: 26,,, | Performed by: RADIOLOGY

## 2023-02-01 ENCOUNTER — PATIENT MESSAGE (OUTPATIENT)
Dept: INTERNAL MEDICINE | Facility: CLINIC | Age: 43
End: 2023-02-01
Payer: COMMERCIAL

## 2023-02-01 ENCOUNTER — HOSPITAL ENCOUNTER (OUTPATIENT)
Dept: RADIOLOGY | Facility: HOSPITAL | Age: 43
Discharge: HOME | End: 2023-02-01
Attending: STUDENT IN AN ORGANIZED HEALTH CARE EDUCATION/TRAINING PROGRAM
Payer: COMMERCIAL

## 2023-02-01 DIAGNOSIS — K76.0 FATTY LIVER: Primary | ICD-10-CM

## 2023-02-01 DIAGNOSIS — Q24.8 INTERATRIAL CARDIAC SHUNT: ICD-10-CM

## 2023-02-01 PROCEDURE — 76705 ECHO EXAM OF ABDOMEN: CPT | Mod: 26,XS,, | Performed by: RADIOLOGY

## 2023-02-01 PROCEDURE — 93976 VASCULAR STUDY: CPT | Mod: TC

## 2023-02-01 PROCEDURE — 93976 US LIVER WITH DOPPLER: ICD-10-PCS | Mod: 26,,, | Performed by: RADIOLOGY

## 2023-02-01 PROCEDURE — 93976 VASCULAR STUDY: CPT | Mod: 26,,, | Performed by: RADIOLOGY

## 2023-02-01 PROCEDURE — 76705 US LIVER WITH DOPPLER: ICD-10-PCS | Mod: 26,XS,, | Performed by: RADIOLOGY

## 2023-02-07 ENCOUNTER — PATIENT MESSAGE (OUTPATIENT)
Dept: PULMONOLOGY | Facility: CLINIC | Age: 43
End: 2023-02-07
Payer: COMMERCIAL

## 2023-02-27 ENCOUNTER — PATIENT MESSAGE (OUTPATIENT)
Dept: INTERNAL MEDICINE | Facility: CLINIC | Age: 43
End: 2023-02-27
Payer: COMMERCIAL

## 2024-04-18 ENCOUNTER — LAB VISIT (OUTPATIENT)
Dept: LAB | Facility: HOSPITAL | Age: 44
End: 2024-04-18
Attending: FAMILY MEDICINE
Payer: COMMERCIAL

## 2024-04-18 ENCOUNTER — OFFICE VISIT (OUTPATIENT)
Dept: INTERNAL MEDICINE | Facility: CLINIC | Age: 44
End: 2024-04-18
Payer: COMMERCIAL

## 2024-04-18 ENCOUNTER — PATIENT MESSAGE (OUTPATIENT)
Dept: INTERNAL MEDICINE | Facility: CLINIC | Age: 44
End: 2024-04-18

## 2024-04-18 VITALS
OXYGEN SATURATION: 99 % | HEART RATE: 81 BPM | BODY MASS INDEX: 31.53 KG/M2 | SYSTOLIC BLOOD PRESSURE: 126 MMHG | TEMPERATURE: 97 F | DIASTOLIC BLOOD PRESSURE: 86 MMHG | WEIGHT: 237.88 LBS | RESPIRATION RATE: 16 BRPM | HEIGHT: 73 IN

## 2024-04-18 DIAGNOSIS — Z12.5 PROSTATE CANCER SCREENING: ICD-10-CM

## 2024-04-18 DIAGNOSIS — Z00.00 WELL ADULT EXAM: ICD-10-CM

## 2024-04-18 DIAGNOSIS — R53.83 FATIGUE, UNSPECIFIED TYPE: ICD-10-CM

## 2024-04-18 DIAGNOSIS — Z00.00 WELL ADULT EXAM: Primary | ICD-10-CM

## 2024-04-18 DIAGNOSIS — R29.818 TRANSIENT NEUROLOGICAL SYMPTOMS: Chronic | ICD-10-CM

## 2024-04-18 DIAGNOSIS — Z72.0 TOBACCO USE: ICD-10-CM

## 2024-04-18 DIAGNOSIS — Q24.8 INTERATRIAL CARDIAC SHUNT: ICD-10-CM

## 2024-04-18 DIAGNOSIS — N52.9 ERECTILE DYSFUNCTION, UNSPECIFIED ERECTILE DYSFUNCTION TYPE: Primary | ICD-10-CM

## 2024-04-18 LAB
ALBUMIN SERPL BCP-MCNC: 4.3 G/DL (ref 3.5–5.2)
ALP SERPL-CCNC: 68 U/L (ref 55–135)
ALT SERPL W/O P-5'-P-CCNC: 117 U/L (ref 10–44)
ANION GAP SERPL CALC-SCNC: 9 MMOL/L (ref 8–16)
AST SERPL-CCNC: 47 U/L (ref 10–40)
BASOPHILS # BLD AUTO: 0.06 K/UL (ref 0–0.2)
BASOPHILS NFR BLD: 1.2 % (ref 0–1.9)
BILIRUB SERPL-MCNC: 0.6 MG/DL (ref 0.1–1)
BUN SERPL-MCNC: 11 MG/DL (ref 6–20)
CALCIUM SERPL-MCNC: 9.8 MG/DL (ref 8.7–10.5)
CHLORIDE SERPL-SCNC: 107 MMOL/L (ref 95–110)
CHOLEST SERPL-MCNC: 178 MG/DL (ref 120–199)
CHOLEST/HDLC SERPL: 4 {RATIO} (ref 2–5)
CO2 SERPL-SCNC: 24 MMOL/L (ref 23–29)
COMPLEXED PSA SERPL-MCNC: 0.26 NG/ML (ref 0–4)
CREAT SERPL-MCNC: 1.2 MG/DL (ref 0.5–1.4)
DIFFERENTIAL METHOD BLD: NORMAL
EOSINOPHIL # BLD AUTO: 0 K/UL (ref 0–0.5)
EOSINOPHIL NFR BLD: 0.4 % (ref 0–8)
ERYTHROCYTE [DISTWIDTH] IN BLOOD BY AUTOMATED COUNT: 12.4 % (ref 11.5–14.5)
EST. GFR  (NO RACE VARIABLE): >60 ML/MIN/1.73 M^2
ESTIMATED AVG GLUCOSE: 117 MG/DL (ref 68–131)
GLUCOSE SERPL-MCNC: 114 MG/DL (ref 70–110)
HBA1C MFR BLD: 5.7 % (ref 4–5.6)
HCT VFR BLD AUTO: 44.5 % (ref 40–54)
HDLC SERPL-MCNC: 45 MG/DL (ref 40–75)
HDLC SERPL: 25.3 % (ref 20–50)
HGB BLD-MCNC: 14.8 G/DL (ref 14–18)
IMM GRANULOCYTES # BLD AUTO: 0.01 K/UL (ref 0–0.04)
IMM GRANULOCYTES NFR BLD AUTO: 0.2 % (ref 0–0.5)
LDLC SERPL CALC-MCNC: 117.6 MG/DL (ref 63–159)
LYMPHOCYTES # BLD AUTO: 2.3 K/UL (ref 1–4.8)
LYMPHOCYTES NFR BLD: 44.2 % (ref 18–48)
MCH RBC QN AUTO: 30.3 PG (ref 27–31)
MCHC RBC AUTO-ENTMCNC: 33.3 G/DL (ref 32–36)
MCV RBC AUTO: 91 FL (ref 82–98)
MONOCYTES # BLD AUTO: 0.4 K/UL (ref 0.3–1)
MONOCYTES NFR BLD: 8.4 % (ref 4–15)
NEUTROPHILS # BLD AUTO: 2.3 K/UL (ref 1.8–7.7)
NEUTROPHILS NFR BLD: 45.6 % (ref 38–73)
NONHDLC SERPL-MCNC: 133 MG/DL
NRBC BLD-RTO: 0 /100 WBC
PLATELET # BLD AUTO: 263 K/UL (ref 150–450)
PMV BLD AUTO: 10.6 FL (ref 9.2–12.9)
POTASSIUM SERPL-SCNC: 4 MMOL/L (ref 3.5–5.1)
PROT SERPL-MCNC: 7.2 G/DL (ref 6–8.4)
RBC # BLD AUTO: 4.88 M/UL (ref 4.6–6.2)
SODIUM SERPL-SCNC: 140 MMOL/L (ref 136–145)
T4 FREE SERPL-MCNC: 1 NG/DL (ref 0.71–1.51)
TESTOST SERPL-MCNC: 305 NG/DL (ref 304–1227)
TRIGL SERPL-MCNC: 77 MG/DL (ref 30–150)
TSH SERPL DL<=0.005 MIU/L-ACNC: 1.37 UIU/ML (ref 0.4–4)
WBC # BLD AUTO: 5.09 K/UL (ref 3.9–12.7)

## 2024-04-18 PROCEDURE — 1159F MED LIST DOCD IN RCRD: CPT | Mod: CPTII,S$GLB,, | Performed by: FAMILY MEDICINE

## 2024-04-18 PROCEDURE — 3074F SYST BP LT 130 MM HG: CPT | Mod: CPTII,S$GLB,, | Performed by: FAMILY MEDICINE

## 2024-04-18 PROCEDURE — 84153 ASSAY OF PSA TOTAL: CPT | Performed by: FAMILY MEDICINE

## 2024-04-18 PROCEDURE — 36415 COLL VENOUS BLD VENIPUNCTURE: CPT | Mod: PO | Performed by: FAMILY MEDICINE

## 2024-04-18 PROCEDURE — 80053 COMPREHEN METABOLIC PANEL: CPT | Performed by: FAMILY MEDICINE

## 2024-04-18 PROCEDURE — 99999 PR PBB SHADOW E&M-EST. PATIENT-LVL IV: CPT | Mod: PBBFAC,,, | Performed by: FAMILY MEDICINE

## 2024-04-18 PROCEDURE — 80061 LIPID PANEL: CPT | Performed by: FAMILY MEDICINE

## 2024-04-18 PROCEDURE — 84443 ASSAY THYROID STIM HORMONE: CPT | Performed by: FAMILY MEDICINE

## 2024-04-18 PROCEDURE — 1160F RVW MEDS BY RX/DR IN RCRD: CPT | Mod: CPTII,S$GLB,, | Performed by: FAMILY MEDICINE

## 2024-04-18 PROCEDURE — 84403 ASSAY OF TOTAL TESTOSTERONE: CPT | Performed by: FAMILY MEDICINE

## 2024-04-18 PROCEDURE — 3008F BODY MASS INDEX DOCD: CPT | Mod: CPTII,S$GLB,, | Performed by: FAMILY MEDICINE

## 2024-04-18 PROCEDURE — 83036 HEMOGLOBIN GLYCOSYLATED A1C: CPT | Performed by: FAMILY MEDICINE

## 2024-04-18 PROCEDURE — 84439 ASSAY OF FREE THYROXINE: CPT | Performed by: FAMILY MEDICINE

## 2024-04-18 PROCEDURE — 99396 PREV VISIT EST AGE 40-64: CPT | Mod: S$GLB,,, | Performed by: FAMILY MEDICINE

## 2024-04-18 PROCEDURE — 3079F DIAST BP 80-89 MM HG: CPT | Mod: CPTII,S$GLB,, | Performed by: FAMILY MEDICINE

## 2024-04-18 PROCEDURE — 85025 COMPLETE CBC W/AUTO DIFF WBC: CPT | Performed by: FAMILY MEDICINE

## 2024-04-18 RX ORDER — BUPROPION HYDROCHLORIDE 150 MG/1
150 TABLET, EXTENDED RELEASE ORAL 2 TIMES DAILY
Qty: 60 TABLET | Refills: 3 | Status: SHIPPED | OUTPATIENT
Start: 2024-04-18 | End: 2025-04-18

## 2024-04-18 RX ORDER — ATORVASTATIN CALCIUM 40 MG/1
40 TABLET, FILM COATED ORAL DAILY
Qty: 90 TABLET | Refills: 3 | Status: SHIPPED | OUTPATIENT
Start: 2024-04-18 | End: 2025-04-18

## 2024-04-18 NOTE — PROGRESS NOTES
Subjective     Patient ID: Sukhwinder Lyon is a 44 y.o. male.    Chief Complaint: Annual Exam    HPI 44-year-old white male former patient of Dr. Wade presents to clinic today to establish care and for annual physical exam.  He has been seen by neurology in the past secondary to a TIA.  MRI was negative for acute infarct.  CTA of the head/neck did not show any high-risk lesions.  A HARINDER was performed which was equivocal for an intracardiac shunt which is currently stable.  It was recommended that the patient continue use of aspirin 81 mg daily and Lipitor 40 mg daily for treatment.  He has an ex-smoker but at this time does vape and has been attempting to quit vaping with some difficulty.  He reports that he did attempt use of Chantix in the past without success.  Other smoking cessation options have been discussed and the patient is interested in trying Wellbutrin.  He reports a strong family history of hypertension and cancer.  He is up-to-date with all vaccinations.  He continues to report occasional fatigue and erectile dysfunction.  Review of Systems   Constitutional:  Positive for fatigue. Negative for activity change, appetite change, chills, fever and unexpected weight change.   HENT:  Negative for nasal congestion, ear pain, hearing loss, postnasal drip, rhinorrhea, sinus pressure/congestion, sore throat, tinnitus and trouble swallowing.    Eyes:  Negative for discharge, redness, itching and visual disturbance.   Respiratory:  Negative for cough, chest tightness, shortness of breath and wheezing.    Cardiovascular:  Negative for chest pain and palpitations.   Gastrointestinal:  Negative for abdominal pain, blood in stool, constipation, diarrhea, nausea and vomiting.   Endocrine: Negative for polydipsia and polyuria.   Genitourinary:  Positive for erectile dysfunction. Negative for decreased urine volume, difficulty urinating, dysuria, frequency, hematuria and urgency.   Musculoskeletal:  Negative for  arthralgias, back pain, joint swelling, myalgias, neck pain and neck stiffness.   Integumentary:  Negative for rash.   Neurological:  Negative for dizziness, weakness, light-headedness and headaches.   Psychiatric/Behavioral: Negative.  Negative for confusion and dysphoric mood.           Objective     Physical Exam  Vitals and nursing note reviewed.   Constitutional:       General: He is not in acute distress.     Appearance: Normal appearance. He is well-developed. He is not diaphoretic.   HENT:      Head: Normocephalic and atraumatic.      Right Ear: External ear normal.      Left Ear: External ear normal.      Nose: Nose normal.      Mouth/Throat:      Pharynx: No oropharyngeal exudate.   Eyes:      General: No scleral icterus.        Right eye: No discharge.         Left eye: No discharge.      Conjunctiva/sclera: Conjunctivae normal.      Pupils: Pupils are equal, round, and reactive to light.   Neck:      Thyroid: No thyromegaly.      Vascular: No JVD.      Trachea: No tracheal deviation.   Cardiovascular:      Rate and Rhythm: Normal rate and regular rhythm.      Heart sounds: Normal heart sounds. No murmur heard.     No friction rub. No gallop.   Pulmonary:      Effort: Pulmonary effort is normal. No respiratory distress.      Breath sounds: Normal breath sounds. No stridor. No wheezing, rhonchi or rales.   Chest:      Chest wall: No tenderness.   Abdominal:      General: Bowel sounds are normal. There is no distension.      Palpations: Abdomen is soft. There is no mass.      Tenderness: There is no abdominal tenderness. There is no guarding or rebound.   Musculoskeletal:         General: No tenderness. Normal range of motion.      Cervical back: Normal range of motion and neck supple.   Lymphadenopathy:      Cervical: No cervical adenopathy.   Skin:     General: Skin is warm and dry.      Coloration: Skin is not pale.      Findings: No erythema or rash.   Neurological:      Mental Status: He is alert and  oriented to person, place, and time.   Psychiatric:         Mood and Affect: Mood normal.         Behavior: Behavior normal.         Thought Content: Thought content normal.         Judgment: Judgment normal.            Assessment and Plan     1. Well adult exam  -     CBC Auto Differential; Future; Expected date: 04/18/2024  -     Comprehensive Metabolic Panel; Future; Expected date: 04/18/2024  -     Lipid Panel; Future; Expected date: 04/18/2024  -     T4, Free; Future; Expected date: 04/18/2024  -     TSH; Future; Expected date: 04/18/2024  -     Hemoglobin A1C; Future; Expected date: 04/18/2024  -     PSA, Screening; Future; Expected date: 04/18/2024  -     TESTOSTERONE; Future; Expected date: 04/18/2024    2. Interatrial cardiac shunt  Overview:  Left to right cardiac shunt as evident on TTE  - outpatient CT chest without contrast  - follow up and referral for Pulmonology to follow up the results    Orders:  -     atorvastatin (LIPITOR) 40 MG tablet; Take 1 tablet (40 mg total) by mouth once daily.  Dispense: 90 tablet; Refill: 3    3. Transient neurological symptoms  -     atorvastatin (LIPITOR) 40 MG tablet; Take 1 tablet (40 mg total) by mouth once daily.  Dispense: 90 tablet; Refill: 3    4. Fatigue, unspecified type  -     TESTOSTERONE; Future; Expected date: 04/18/2024    5. Prostate cancer screening  -     PSA, Screening; Future; Expected date: 04/18/2024    6. Tobacco use  -     buPROPion (WELLBUTRIN SR) 150 MG TBSR 12 hr tablet; Take 1 tablet (150 mg total) by mouth 2 (two) times daily.  Dispense: 60 tablet; Refill: 3        1. CBC, CMP, TSH, free T4, fasting lipids, hemoglobin A1c, and testosterone level.    2. Patient had a previous TIA with negative workup besides HARINDER revealing an intracardiac shunt which remains stable.  Continue aspirin 81 mg daily and Lipitor 40 mg daily.    3. Will check testosterone levels for further evaluation of fatigue and erectile dysfunction.  4. Wellbutrin  mg  b.i.d. for smoking cessation.    5. Return to clinic as needed or in 1 year for annual physical exam.               Follow up in about 1 year (around 4/18/2025), or if symptoms worsen or fail to improve, for Annual exam.

## 2024-05-07 ENCOUNTER — OFFICE VISIT (OUTPATIENT)
Dept: UROLOGY | Facility: CLINIC | Age: 44
End: 2024-05-07
Payer: COMMERCIAL

## 2024-05-07 VITALS
HEIGHT: 73 IN | DIASTOLIC BLOOD PRESSURE: 103 MMHG | WEIGHT: 233.69 LBS | BODY MASS INDEX: 30.97 KG/M2 | HEART RATE: 84 BPM | SYSTOLIC BLOOD PRESSURE: 147 MMHG

## 2024-05-07 DIAGNOSIS — N52.9 ERECTILE DYSFUNCTION, UNSPECIFIED ERECTILE DYSFUNCTION TYPE: ICD-10-CM

## 2024-05-07 PROCEDURE — 1160F RVW MEDS BY RX/DR IN RCRD: CPT | Mod: CPTII,S$GLB,,

## 2024-05-07 PROCEDURE — 3008F BODY MASS INDEX DOCD: CPT | Mod: CPTII,S$GLB,,

## 2024-05-07 PROCEDURE — 3077F SYST BP >= 140 MM HG: CPT | Mod: CPTII,S$GLB,,

## 2024-05-07 PROCEDURE — 1159F MED LIST DOCD IN RCRD: CPT | Mod: CPTII,S$GLB,,

## 2024-05-07 PROCEDURE — 3080F DIAST BP >= 90 MM HG: CPT | Mod: CPTII,S$GLB,,

## 2024-05-07 PROCEDURE — 3044F HG A1C LEVEL LT 7.0%: CPT | Mod: CPTII,S$GLB,,

## 2024-05-07 PROCEDURE — 99999 PR PBB SHADOW E&M-EST. PATIENT-LVL IV: CPT | Mod: PBBFAC,,,

## 2024-05-07 PROCEDURE — 99204 OFFICE O/P NEW MOD 45 MIN: CPT | Mod: S$GLB,,,

## 2024-05-07 RX ORDER — SILDENAFIL 100 MG/1
100 TABLET, FILM COATED ORAL DAILY PRN
Qty: 15 TABLET | Refills: 11 | Status: SHIPPED | OUTPATIENT
Start: 2024-05-07 | End: 2025-05-07

## 2024-05-07 NOTE — PATIENT INSTRUCTIONS
Discussed possible risks associated with TRT including: worsening sleep apnea, acne or other skin rashes, stimulating non-cancerous growth of the prostate gland, increasing red blood cell count which could lead to blood clot formation - heart attack or stroke. The best way to increase T naturally is through healthy diet and regular exercise, including limiting the amount of processed foods in the diet. Once T is supplemented, the body stops producing T on its own.     TRT monitoring includes labs and in person office visit every 6 months: serum testosterone, liver function tests, H/H, and lipid profile. For men over 40 years include PSA and PT/PTT for men on coumadin. KENYETTA every year. Explained the risks and benefits associated with TRT.

## 2024-05-07 NOTE — PROGRESS NOTES
CHIEF COMPLAINT:  ED      HISTORY OF PRESENTING ILLINESS:  Sukhwinder Lyon is a 44 y.o. male new to [facility]. He is a referral from Dr. Parsons for ED. States ED has been present x 2 months. No changes in relationship, no changes in work or personal stress. He does get AM erections sometimes. He has been on Wellbutrin ~3 weeks without SE. His T is normal 305, 2024 at 0836. No family hx of bladder, prostate or kidney cancer.     He has been seen by neurology in the past secondary to a TIA. MRI was negative for acute infarct. CTA of the head/neck did not show any high-risk lesions. A HARINDRE was performed which was equivocal for an intracardiac shunt which is currently stable. It was recommended that the patient continue use of aspirin 81 mg daily and Lipitor 40 mg daily for treatment. He has an ex-smoker but at this time does vape and has been attempting to quit vaping with some difficulty.       REVIEW OF SYSTEMS:  Review of Systems   Constitutional:  Negative for chills and fever.   HENT:  Negative for congestion and sore throat.    Respiratory:  Negative for cough and shortness of breath.    Cardiovascular:  Negative for chest pain and palpitations.   Gastrointestinal:  Negative for nausea and vomiting.   Genitourinary:  Negative for dysuria, flank pain, frequency, hematuria and urgency.   Neurological:  Negative for dizziness and headaches.         PATIENT HISTORY:    History reviewed. No pertinent past medical history.    History reviewed. No pertinent surgical history.    Family History   Problem Relation Name Age of Onset    Alcohol abuse Paternal Uncle Franklin Lyon     Cancer Paternal Uncle Franklin Lyon         Lung Cancer/    Early death Paternal Uncle Franklin Lyon          at 50 years old, cancer    Alcohol abuse Maternal Uncle Orville Dontae     COPD Maternal Uncle Orville Dontae     Alcohol abuse Maternal Uncle Efe Dontae     Early death Maternal Uncle Efe Dontae          at  ", alcohol related liver disease    Alcohol abuse Maternal Grandfather Moris Diggs     Early death Maternal Grandfather Moris Diggs          in 50's, myocardial infarction    Heart disease Maternal Grandfather Moris Diggs     Arthritis Maternal Grandmother Ericka House     COPD Maternal Grandmother Ericka House     Depression Maternal Grandmother Ericka House         Onset after death of daughter    Hearing loss Maternal Grandmother Ericka House     Heart disease Maternal Grandmother Ericka House     Arthritis Paternal Grandmother Miguel ASCENCIO'Das-Sonali     Diabetes Paternal Grandmother Miguel ASCENCIO'Das-Sonali     Cancer Father Leon Lyon         Lung Cancer/    Depression Father Leon Lyon         Onset after death of wife    Early death Father Leon Lyon          at 59 years old, cancer    Hypertension Father Leon Lyon     Cancer Paternal Grandfather Khushi Lyon         Lung Cancer/    Cancer Paternal Aunt Johnna Garza         Lung Cancer/    Cancer Paternal Uncle Trell Lyon         Colon Cancer/    COPD Paternal Uncle Jovita "Bud" Sonali     Early death Mother Alicia Lyon          at 47 years old, pneumonia and sepsis       Social History     Socioeconomic History    Marital status:    Tobacco Use    Smoking status: Former     Types: Vaping with nicotine    Smokeless tobacco: Never    Tobacco comments:     Former smoker- 10 pack years, switched to vaping-half pod per day; working to quit currently.   Substance and Sexual Activity    Alcohol use: Yes     Alcohol/week: 3.0 standard drinks of alcohol     Types: 3 Glasses of wine per week     Comment: Instructed by vascular neurology to have 1-2 drinks daily.    Drug use: Not Currently     Types: Marijuana     Comment: Occasional use of cannabis    Sexual activity: Yes     Partners: Male     Birth control/protection: Condom     Comment: One sexual partner, spouse. "     Social Determinants of Health     Financial Resource Strain: Low Risk  (1/8/2024)    Overall Financial Resource Strain (CARDIA)     Difficulty of Paying Living Expenses: Not hard at all   Food Insecurity: No Food Insecurity (1/8/2024)    Hunger Vital Sign     Worried About Running Out of Food in the Last Year: Never true     Ran Out of Food in the Last Year: Never true   Transportation Needs: No Transportation Needs (1/8/2024)    PRAPARE - Transportation     Lack of Transportation (Medical): No     Lack of Transportation (Non-Medical): No   Physical Activity: Sufficiently Active (1/8/2024)    Exercise Vital Sign     Days of Exercise per Week: 3 days     Minutes of Exercise per Session: 50 min   Stress: No Stress Concern Present (1/8/2024)    [employer] - Occupational Stress Questionnaire     Feeling of Stress : Not at all   Housing Stability: Low Risk  (1/8/2024)    Housing Stability Vital Sign     Unable to Pay for Housing in the Last Year: No     Number of Places Lived in the Last Year: 1     Unstable Housing in the Last Year: No       Allergies:  Patient has no known allergies.    Medications:    Current Outpatient Medications:     atorvastatin (LIPITOR) 40 MG tablet, Take 1 tablet (40 mg total) by mouth once daily., Disp: 90 tablet, Rfl: 3    buPROPion (WELLBUTRIN SR) 150 MG TBSR 12 hr tablet, Take 1 tablet (150 mg total) by mouth 2 (two) times daily., Disp: 60 tablet, Rfl: 3    aspirin (ECOTRIN) 81 MG EC tablet, Take 1 tablet (81 mg total) by mouth once daily., Disp: 90 tablet, Rfl: 3    sildenafiL (VIAGRA) 100 MG tablet, Take 1 tablet (100 mg total) by mouth daily as needed for Erectile Dysfunction (take on an empty stomach 1 hour prior to intercourse)., Disp: 15 tablet, Rfl: 11    PHYSICAL EXAMINATION:  Physical Exam  Constitutional:       Appearance: Normal appearance.   HENT:      Head: Normocephalic and atraumatic.      Right Ear: External ear normal.      Left Ear:  External ear normal.      Nose: Nose normal.      Mouth/Throat:      Mouth: Mucous membranes are moist.   Pulmonary:      Effort: Pulmonary effort is normal. No respiratory distress.   Skin:     General: Skin is warm and dry.   Neurological:      General: No focal deficit present.      Mental Status: He is alert and oriented to person, place, and time.   Psychiatric:         Mood and Affect: Mood normal.         Behavior: Behavior normal.           Lab Results   Component Value Date    PSA 0.26 04/18/2024    PSA 0.20 01/11/2023    PSA 0.27 11/10/2021       Lab Results   Component Value Date    CREATININE 1.2 04/18/2024    EGFRNORACEVR >60.0 04/18/2024           IMPRESSION:    Encounter Diagnoses   Name Primary?    Erectile dysfunction, unspecified erectile dysfunction type          Assessment:       1. Erectile dysfunction, unspecified erectile dysfunction type        Plan:   - Trial of sildenafil.  Discussed side effects including but not limited to myalgia, headaches and priapism.  Patient was instructed to report to the ED if he experiences a erection lasting 4 hours or more. Patient educated that failing to seek medical attention could lead to potential irreversible penile damage. He voiced understanding.      RTC 6-8 weeks to discuss medication efficacy    I spent 45 minutes with the patient of which more than half was spent in direct consultation with the patient in regards to our treatment and plan.  We addressed the office findings and recent labs; any need to go ER today.   Education and recommendations of today's plan of care including home remedies and needed follow up with PCP.

## 2024-08-12 DIAGNOSIS — Z72.0 TOBACCO USE: ICD-10-CM

## 2024-08-12 RX ORDER — BUPROPION HYDROCHLORIDE 150 MG/1
150 TABLET, EXTENDED RELEASE ORAL 2 TIMES DAILY
Qty: 180 TABLET | Refills: 2 | Status: SHIPPED | OUTPATIENT
Start: 2024-08-12

## 2024-08-12 NOTE — TELEPHONE ENCOUNTER
Refill Routing Note   Medication(s) are not appropriate for processing by [facility] for the following reason(s):        Required vitals abnormal    [facility] action(s):  Defer               Appointments  past 12m or future 3m with PCP    Date Provider   Last Visit   4/18/2024 Trell Parsons MD   Next Visit   Visit date not found Trell Parsons MD   ED visits in past 90 days: 0        Note composed:2:54 PM 08/12/2024

## 2024-08-12 NOTE — TELEPHONE ENCOUNTER
No care due was identified.  [facility] Embedded Care Due Messages. Reference number: [id number].   8/12/2024 3:21:23 AM CDT

## 2025-02-03 ENCOUNTER — TELEPHONE (OUTPATIENT)
Dept: PHARMACY | Facility: CLINIC | Age: 45
End: 2025-02-03
Payer: COMMERCIAL

## 2025-02-03 NOTE — TELEPHONE ENCOUNTER
[facility]/Population Health Chart Review & Patient Outreach Details For Medication Adherence Project    Reason for Outreach Encounter: 3rd Party payor non-compliance report ([employer], [employer], C, etc)  2.  Patient Outreach Method: [facility] message  3.   Medication in question: atorvastatin   LAST FILLED: 7/15/24 for 90 day supply  Hyperlipidemia Medications               atorvastatin (LIPITOR) 40 MG tablet Take 1 tablet (40 mg total) by mouth once daily.               4.  Reviewed and or Updates Made To: Patient Chart  5. Outreach Outcomes and/or actions taken: Sent inquiry to patient: Waiting for response.

## 2025-05-21 ENCOUNTER — TELEPHONE (OUTPATIENT)
Dept: PHARMACY | Facility: CLINIC | Age: 45
End: 2025-05-21
Payer: COMMERCIAL

## 2025-05-21 NOTE — TELEPHONE ENCOUNTER
[facility]/Population Health Chart Review & Patient Outreach Details For Medication Adherence Project    Reason for Outreach Encounter: 3rd Party payor non-compliance report ([employer], [employer], [employer], etc)  2.  Patient Outreach Method: Reviewed patient chart  and [facility] message  3.   Medication in question:    Hyperlipidemia Medications              atorvastatin (LIPITOR) 40 MG tablet Take 1 tablet (40 mg total) by mouth once daily.                  LF 90 ds 7/15/24    4.  Reviewed and or Updates Made To: Patient Chart  5. Outreach Outcomes and/or actions taken: Sent inquiry to patient: Waiting for response  Additional Notes: